# Patient Record
Sex: MALE | Race: WHITE | Employment: FULL TIME | URBAN - METROPOLITAN AREA
[De-identification: names, ages, dates, MRNs, and addresses within clinical notes are randomized per-mention and may not be internally consistent; named-entity substitution may affect disease eponyms.]

---

## 2021-10-22 ENCOUNTER — APPOINTMENT (OUTPATIENT)
Dept: CT IMAGING | Age: 56
End: 2021-10-22
Payer: MEDICAID

## 2021-10-22 ENCOUNTER — APPOINTMENT (OUTPATIENT)
Dept: GENERAL RADIOLOGY | Age: 56
End: 2021-10-22
Payer: MEDICAID

## 2021-10-22 ENCOUNTER — APPOINTMENT (OUTPATIENT)
Dept: MRI IMAGING | Age: 56
DRG: 045 | End: 2021-10-22
Attending: INTERNAL MEDICINE
Payer: MEDICAID

## 2021-10-22 ENCOUNTER — HOSPITAL ENCOUNTER (INPATIENT)
Age: 56
LOS: 2 days | Discharge: HOME | DRG: 045 | End: 2021-10-24
Attending: INTERNAL MEDICINE | Admitting: INTERNAL MEDICINE
Payer: MEDICAID

## 2021-10-22 ENCOUNTER — HOSPITAL ENCOUNTER (EMERGENCY)
Age: 56
Discharge: ANOTHER HOSPITAL | End: 2021-10-22
Attending: EMERGENCY MEDICINE
Payer: MEDICAID

## 2021-10-22 ENCOUNTER — APPOINTMENT (OUTPATIENT)
Dept: GENERAL RADIOLOGY | Age: 56
DRG: 045 | End: 2021-10-22
Attending: INTERNAL MEDICINE
Payer: MEDICAID

## 2021-10-22 VITALS
DIASTOLIC BLOOD PRESSURE: 94 MMHG | OXYGEN SATURATION: 95 % | HEIGHT: 72 IN | HEART RATE: 98 BPM | RESPIRATION RATE: 24 BRPM | SYSTOLIC BLOOD PRESSURE: 133 MMHG | WEIGHT: 280.43 LBS | TEMPERATURE: 98.4 F | BODY MASS INDEX: 37.98 KG/M2

## 2021-10-22 DIAGNOSIS — R20.2 ARM PARESTHESIA, RIGHT: ICD-10-CM

## 2021-10-22 DIAGNOSIS — I63.9 CEREBROVASCULAR ACCIDENT (CVA), UNSPECIFIED MECHANISM (HCC): Primary | ICD-10-CM

## 2021-10-22 DIAGNOSIS — R77.8 ELEVATED TROPONIN: ICD-10-CM

## 2021-10-22 DIAGNOSIS — I10 ACCELERATED HYPERTENSION: ICD-10-CM

## 2021-10-22 DIAGNOSIS — I63.9 ACUTE CVA (CEREBROVASCULAR ACCIDENT) (HCC): Primary | ICD-10-CM

## 2021-10-22 DIAGNOSIS — I50.9 ACUTE ON CHRONIC CONGESTIVE HEART FAILURE, UNSPECIFIED HEART FAILURE TYPE (HCC): ICD-10-CM

## 2021-10-22 DIAGNOSIS — E11.65 TYPE 2 DIABETES MELLITUS WITH HYPERGLYCEMIA, WITHOUT LONG-TERM CURRENT USE OF INSULIN (HCC): ICD-10-CM

## 2021-10-22 DIAGNOSIS — R53.1 ACUTE RIGHT-SIDED WEAKNESS: ICD-10-CM

## 2021-10-22 DIAGNOSIS — R09.02 HYPOXIA: ICD-10-CM

## 2021-10-22 PROBLEM — I63.512 CEREBROVASCULAR ACCIDENT (CVA) DUE TO OCCLUSION OF LEFT MIDDLE CEREBRAL ARTERY (HCC): Status: ACTIVE | Noted: 2021-10-22

## 2021-10-22 LAB
A/G RATIO: 1.2 (ref 1.1–2.2)
ALBUMIN SERPL-MCNC: 4.1 G/DL (ref 3.4–5)
ALP BLD-CCNC: 106 U/L (ref 40–129)
ALT SERPL-CCNC: 28 U/L (ref 10–40)
ANION GAP SERPL CALCULATED.3IONS-SCNC: 11 MMOL/L (ref 3–16)
ANION GAP SERPL CALCULATED.3IONS-SCNC: 12 MMOL/L (ref 3–16)
APTT: 28.4 SEC (ref 26.2–38.6)
AST SERPL-CCNC: 18 U/L (ref 15–37)
BASOPHILS ABSOLUTE: 0.1 K/UL (ref 0–0.2)
BASOPHILS ABSOLUTE: 0.2 K/UL (ref 0–0.2)
BASOPHILS RELATIVE PERCENT: 0.7 %
BASOPHILS RELATIVE PERCENT: 1.4 %
BILIRUB SERPL-MCNC: 2.2 MG/DL (ref 0–1)
BUN BLDV-MCNC: 16 MG/DL (ref 7–20)
BUN BLDV-MCNC: 18 MG/DL (ref 7–20)
CALCIUM SERPL-MCNC: 8.5 MG/DL (ref 8.3–10.6)
CALCIUM SERPL-MCNC: 9.3 MG/DL (ref 8.3–10.6)
CHLORIDE BLD-SCNC: 98 MMOL/L (ref 99–110)
CHLORIDE BLD-SCNC: 99 MMOL/L (ref 99–110)
CO2: 26 MMOL/L (ref 21–32)
CO2: 28 MMOL/L (ref 21–32)
CREAT SERPL-MCNC: 1 MG/DL (ref 0.9–1.3)
CREAT SERPL-MCNC: 1.1 MG/DL (ref 0.9–1.3)
EKG ATRIAL RATE: 85 BPM
EKG ATRIAL RATE: 99 BPM
EKG DIAGNOSIS: NORMAL
EKG DIAGNOSIS: NORMAL
EKG P AXIS: 57 DEGREES
EKG P AXIS: 65 DEGREES
EKG P-R INTERVAL: 164 MS
EKG P-R INTERVAL: 166 MS
EKG Q-T INTERVAL: 364 MS
EKG Q-T INTERVAL: 408 MS
EKG QRS DURATION: 100 MS
EKG QRS DURATION: 98 MS
EKG QTC CALCULATION (BAZETT): 467 MS
EKG QTC CALCULATION (BAZETT): 485 MS
EKG R AXIS: -20 DEGREES
EKG R AXIS: 12 DEGREES
EKG T AXIS: 71 DEGREES
EKG T AXIS: 78 DEGREES
EKG VENTRICULAR RATE: 85 BPM
EKG VENTRICULAR RATE: 99 BPM
EOSINOPHILS ABSOLUTE: 0.4 K/UL (ref 0–0.6)
EOSINOPHILS ABSOLUTE: 0.6 K/UL (ref 0–0.6)
EOSINOPHILS RELATIVE PERCENT: 3.4 %
EOSINOPHILS RELATIVE PERCENT: 4.8 %
GFR AFRICAN AMERICAN: >60
GFR AFRICAN AMERICAN: >60
GFR NON-AFRICAN AMERICAN: >60
GFR NON-AFRICAN AMERICAN: >60
GLOBULIN: 3.3 G/DL
GLUCOSE BLD-MCNC: 207 MG/DL (ref 70–99)
GLUCOSE BLD-MCNC: 214 MG/DL (ref 70–99)
GLUCOSE BLD-MCNC: 215 MG/DL (ref 70–99)
GLUCOSE BLD-MCNC: 218 MG/DL (ref 70–99)
GLUCOSE BLD-MCNC: 230 MG/DL (ref 70–99)
GLUCOSE BLD-MCNC: 264 MG/DL (ref 70–99)
GLUCOSE BLD-MCNC: 266 MG/DL (ref 70–99)
HCT VFR BLD CALC: 41.9 % (ref 40.5–52.5)
HCT VFR BLD CALC: 44.6 % (ref 40.5–52.5)
HEMOGLOBIN: 13.6 G/DL (ref 13.5–17.5)
HEMOGLOBIN: 14.7 G/DL (ref 13.5–17.5)
INR BLD: 1.37 (ref 0.88–1.12)
LV EF: 13 %
LVEF MODALITY: NORMAL
LYMPHOCYTES ABSOLUTE: 1.9 K/UL (ref 1–5.1)
LYMPHOCYTES ABSOLUTE: 2.8 K/UL (ref 1–5.1)
LYMPHOCYTES RELATIVE PERCENT: 17 %
LYMPHOCYTES RELATIVE PERCENT: 21.2 %
MAGNESIUM: 1.6 MG/DL (ref 1.8–2.4)
MCH RBC QN AUTO: 29.3 PG (ref 26–34)
MCH RBC QN AUTO: 29.4 PG (ref 26–34)
MCHC RBC AUTO-ENTMCNC: 32.6 G/DL (ref 31–36)
MCHC RBC AUTO-ENTMCNC: 33.1 G/DL (ref 31–36)
MCV RBC AUTO: 89.1 FL (ref 80–100)
MCV RBC AUTO: 89.9 FL (ref 80–100)
MONOCYTES ABSOLUTE: 0.6 K/UL (ref 0–1.3)
MONOCYTES ABSOLUTE: 0.7 K/UL (ref 0–1.3)
MONOCYTES RELATIVE PERCENT: 5.5 %
MONOCYTES RELATIVE PERCENT: 5.5 %
NEUTROPHILS ABSOLUTE: 8.2 K/UL (ref 1.7–7.7)
NEUTROPHILS ABSOLUTE: 8.9 K/UL (ref 1.7–7.7)
NEUTROPHILS RELATIVE PERCENT: 67.1 %
NEUTROPHILS RELATIVE PERCENT: 73.4 %
PDW BLD-RTO: 14.3 % (ref 12.4–15.4)
PDW BLD-RTO: 14.4 % (ref 12.4–15.4)
PERFORMED ON: ABNORMAL
PLATELET # BLD: 163 K/UL (ref 135–450)
PLATELET # BLD: 197 K/UL (ref 135–450)
PMV BLD AUTO: 10.5 FL (ref 5–10.5)
PMV BLD AUTO: 9.8 FL (ref 5–10.5)
POTASSIUM REFLEX MAGNESIUM: 3.6 MMOL/L (ref 3.5–5.1)
POTASSIUM SERPL-SCNC: 3.4 MMOL/L (ref 3.5–5.1)
PRO-BNP: 2399 PG/ML (ref 0–124)
PROTHROMBIN TIME: 15.7 SEC (ref 9.9–12.7)
RBC # BLD: 4.66 M/UL (ref 4.2–5.9)
RBC # BLD: 5 M/UL (ref 4.2–5.9)
SARS-COV-2, NAAT: NOT DETECTED
SODIUM BLD-SCNC: 136 MMOL/L (ref 136–145)
SODIUM BLD-SCNC: 138 MMOL/L (ref 136–145)
TOTAL PROTEIN: 7.4 G/DL (ref 6.4–8.2)
TROPONIN: 0.04 NG/ML
TROPONIN: 0.07 NG/ML
TROPONIN: 0.08 NG/ML
TROPONIN: <0.01 NG/ML
WBC # BLD: 11.1 K/UL (ref 4–11)
WBC # BLD: 13.2 K/UL (ref 4–11)

## 2021-10-22 PROCEDURE — 2000000000 HC ICU R&B

## 2021-10-22 PROCEDURE — 96365 THER/PROPH/DIAG IV INF INIT: CPT

## 2021-10-22 PROCEDURE — 6370000000 HC RX 637 (ALT 250 FOR IP): Performed by: STUDENT IN AN ORGANIZED HEALTH CARE EDUCATION/TRAINING PROGRAM

## 2021-10-22 PROCEDURE — 6360000004 HC RX CONTRAST MEDICATION: Performed by: STUDENT IN AN ORGANIZED HEALTH CARE EDUCATION/TRAINING PROGRAM

## 2021-10-22 PROCEDURE — 93005 ELECTROCARDIOGRAM TRACING: CPT | Performed by: EMERGENCY MEDICINE

## 2021-10-22 PROCEDURE — 4A03X5D MEASUREMENT OF ARTERIAL FLOW, INTRACRANIAL, EXTERNAL APPROACH: ICD-10-PCS | Performed by: INTERNAL MEDICINE

## 2021-10-22 PROCEDURE — 92523 SPEECH SOUND LANG COMPREHEN: CPT

## 2021-10-22 PROCEDURE — U0003 INFECTIOUS AGENT DETECTION BY NUCLEIC ACID (DNA OR RNA); SEVERE ACUTE RESPIRATORY SYNDROME CORONAVIRUS 2 (SARS-COV-2) (CORONAVIRUS DISEASE [COVID-19]), AMPLIFIED PROBE TECHNIQUE, MAKING USE OF HIGH THROUGHPUT TECHNOLOGIES AS DESCRIBED BY CMS-2020-01-R: HCPCS

## 2021-10-22 PROCEDURE — 70450 CT HEAD/BRAIN W/O DYE: CPT

## 2021-10-22 PROCEDURE — 96375 TX/PRO/DX INJ NEW DRUG ADDON: CPT

## 2021-10-22 PROCEDURE — 87635 SARS-COV-2 COVID-19 AMP PRB: CPT

## 2021-10-22 PROCEDURE — 36415 COLL VENOUS BLD VENIPUNCTURE: CPT

## 2021-10-22 PROCEDURE — 70496 CT ANGIOGRAPHY HEAD: CPT

## 2021-10-22 PROCEDURE — 6360000002 HC RX W HCPCS: Performed by: STUDENT IN AN ORGANIZED HEALTH CARE EDUCATION/TRAINING PROGRAM

## 2021-10-22 PROCEDURE — C8929 TTE W OR WO FOL WCON,DOPPLER: HCPCS

## 2021-10-22 PROCEDURE — 85730 THROMBOPLASTIN TIME PARTIAL: CPT

## 2021-10-22 PROCEDURE — 73630 X-RAY EXAM OF FOOT: CPT

## 2021-10-22 PROCEDURE — 85610 PROTHROMBIN TIME: CPT

## 2021-10-22 PROCEDURE — 85025 COMPLETE CBC W/AUTO DIFF WBC: CPT

## 2021-10-22 PROCEDURE — 99284 EMERGENCY DEPT VISIT MOD MDM: CPT

## 2021-10-22 PROCEDURE — 83735 ASSAY OF MAGNESIUM: CPT

## 2021-10-22 PROCEDURE — U0005 INFEC AGEN DETEC AMPLI PROBE: HCPCS

## 2021-10-22 PROCEDURE — 6360000002 HC RX W HCPCS: Performed by: EMERGENCY MEDICINE

## 2021-10-22 PROCEDURE — 99223 1ST HOSP IP/OBS HIGH 75: CPT | Performed by: PSYCHIATRY & NEUROLOGY

## 2021-10-22 PROCEDURE — 93005 ELECTROCARDIOGRAM TRACING: CPT | Performed by: STUDENT IN AN ORGANIZED HEALTH CARE EDUCATION/TRAINING PROGRAM

## 2021-10-22 PROCEDURE — 83880 ASSAY OF NATRIURETIC PEPTIDE: CPT

## 2021-10-22 PROCEDURE — 80048 BASIC METABOLIC PNL TOTAL CA: CPT

## 2021-10-22 PROCEDURE — 2500000003 HC RX 250 WO HCPCS: Performed by: STUDENT IN AN ORGANIZED HEALTH CARE EDUCATION/TRAINING PROGRAM

## 2021-10-22 PROCEDURE — 99223 1ST HOSP IP/OBS HIGH 75: CPT | Performed by: INTERNAL MEDICINE

## 2021-10-22 PROCEDURE — 2500000003 HC RX 250 WO HCPCS: Performed by: EMERGENCY MEDICINE

## 2021-10-22 PROCEDURE — 92610 EVALUATE SWALLOWING FUNCTION: CPT

## 2021-10-22 PROCEDURE — APPNB180 APP NON BILLABLE TIME > 60 MINS: Performed by: NURSE PRACTITIONER

## 2021-10-22 PROCEDURE — 84484 ASSAY OF TROPONIN QUANT: CPT

## 2021-10-22 PROCEDURE — 80053 COMPREHEN METABOLIC PANEL: CPT

## 2021-10-22 PROCEDURE — 94150 VITAL CAPACITY TEST: CPT

## 2021-10-22 PROCEDURE — 93010 ELECTROCARDIOGRAM REPORT: CPT | Performed by: INTERNAL MEDICINE

## 2021-10-22 PROCEDURE — 71045 X-RAY EXAM CHEST 1 VIEW: CPT

## 2021-10-22 PROCEDURE — 6360000004 HC RX CONTRAST MEDICATION: Performed by: EMERGENCY MEDICINE

## 2021-10-22 PROCEDURE — 2700000000 HC OXYGEN THERAPY PER DAY

## 2021-10-22 PROCEDURE — 94761 N-INVAS EAR/PLS OXIMETRY MLT: CPT

## 2021-10-22 PROCEDURE — 70551 MRI BRAIN STEM W/O DYE: CPT

## 2021-10-22 RX ORDER — INSULIN LISPRO 100 [IU]/ML
0-6 INJECTION, SOLUTION INTRAVENOUS; SUBCUTANEOUS
Status: DISCONTINUED | OUTPATIENT
Start: 2021-10-22 | End: 2021-10-23

## 2021-10-22 RX ORDER — DEXTROSE MONOHYDRATE 25 G/50ML
12.5 INJECTION, SOLUTION INTRAVENOUS PRN
Status: DISCONTINUED | OUTPATIENT
Start: 2021-10-22 | End: 2021-10-24 | Stop reason: HOSPADM

## 2021-10-22 RX ORDER — ASPIRIN 81 MG/1
81 TABLET ORAL DAILY
Status: DISCONTINUED | OUTPATIENT
Start: 2021-10-23 | End: 2021-10-24 | Stop reason: HOSPADM

## 2021-10-22 RX ORDER — ASPIRIN 300 MG/1
300 SUPPOSITORY RECTAL DAILY
Status: DISCONTINUED | OUTPATIENT
Start: 2021-10-23 | End: 2021-10-24 | Stop reason: HOSPADM

## 2021-10-22 RX ORDER — MAGNESIUM SULFATE IN WATER 40 MG/ML
2000 INJECTION, SOLUTION INTRAVENOUS ONCE
Status: COMPLETED | OUTPATIENT
Start: 2021-10-22 | End: 2021-10-22

## 2021-10-22 RX ORDER — POLYETHYLENE GLYCOL 3350 17 G/17G
17 POWDER, FOR SOLUTION ORAL DAILY PRN
Status: DISCONTINUED | OUTPATIENT
Start: 2021-10-22 | End: 2021-10-24 | Stop reason: HOSPADM

## 2021-10-22 RX ORDER — FUROSEMIDE 10 MG/ML
20 INJECTION INTRAMUSCULAR; INTRAVENOUS ONCE
Status: COMPLETED | OUTPATIENT
Start: 2021-10-22 | End: 2021-10-22

## 2021-10-22 RX ORDER — FUROSEMIDE 10 MG/ML
40 INJECTION INTRAMUSCULAR; INTRAVENOUS ONCE
Status: COMPLETED | OUTPATIENT
Start: 2021-10-22 | End: 2021-10-22

## 2021-10-22 RX ORDER — SODIUM CHLORIDE 0.9 % (FLUSH) 0.9 %
5-40 SYRINGE (ML) INJECTION EVERY 12 HOURS SCHEDULED
Status: DISCONTINUED | OUTPATIENT
Start: 2021-10-22 | End: 2021-10-22 | Stop reason: HOSPADM

## 2021-10-22 RX ORDER — INSULIN LISPRO 100 [IU]/ML
0-3 INJECTION, SOLUTION INTRAVENOUS; SUBCUTANEOUS NIGHTLY
Status: DISCONTINUED | OUTPATIENT
Start: 2021-10-22 | End: 2021-10-23

## 2021-10-22 RX ORDER — SODIUM CHLORIDE 0.9 % (FLUSH) 0.9 %
5-40 SYRINGE (ML) INJECTION PRN
Status: DISCONTINUED | OUTPATIENT
Start: 2021-10-22 | End: 2021-10-22 | Stop reason: HOSPADM

## 2021-10-22 RX ORDER — ONDANSETRON 2 MG/ML
4 INJECTION INTRAMUSCULAR; INTRAVENOUS ONCE
Status: COMPLETED | OUTPATIENT
Start: 2021-10-22 | End: 2021-10-22

## 2021-10-22 RX ORDER — ONDANSETRON 2 MG/ML
4 INJECTION INTRAMUSCULAR; INTRAVENOUS EVERY 6 HOURS PRN
Status: DISCONTINUED | OUTPATIENT
Start: 2021-10-22 | End: 2021-10-24 | Stop reason: HOSPADM

## 2021-10-22 RX ORDER — LABETALOL HYDROCHLORIDE 5 MG/ML
10 INJECTION, SOLUTION INTRAVENOUS EVERY 10 MIN PRN
Status: DISCONTINUED | OUTPATIENT
Start: 2021-10-22 | End: 2021-10-24 | Stop reason: HOSPADM

## 2021-10-22 RX ORDER — DEXTROSE MONOHYDRATE 50 MG/ML
100 INJECTION, SOLUTION INTRAVENOUS PRN
Status: DISCONTINUED | OUTPATIENT
Start: 2021-10-22 | End: 2021-10-24 | Stop reason: HOSPADM

## 2021-10-22 RX ORDER — LABETALOL HYDROCHLORIDE 5 MG/ML
20 INJECTION, SOLUTION INTRAVENOUS ONCE
Status: COMPLETED | OUTPATIENT
Start: 2021-10-22 | End: 2021-10-22

## 2021-10-22 RX ORDER — SODIUM CHLORIDE 9 MG/ML
25 INJECTION, SOLUTION INTRAVENOUS PRN
Status: DISCONTINUED | OUTPATIENT
Start: 2021-10-22 | End: 2021-10-22 | Stop reason: HOSPADM

## 2021-10-22 RX ORDER — ONDANSETRON 4 MG/1
4 TABLET, ORALLY DISINTEGRATING ORAL EVERY 8 HOURS PRN
Status: DISCONTINUED | OUTPATIENT
Start: 2021-10-22 | End: 2021-10-24 | Stop reason: HOSPADM

## 2021-10-22 RX ORDER — 0.9 % SODIUM CHLORIDE 0.9 %
50 INTRAVENOUS SOLUTION INTRAVENOUS ONCE
Status: DISCONTINUED | OUTPATIENT
Start: 2021-10-22 | End: 2021-10-22 | Stop reason: HOSPADM

## 2021-10-22 RX ORDER — NICOTINE POLACRILEX 4 MG
15 LOZENGE BUCCAL PRN
Status: DISCONTINUED | OUTPATIENT
Start: 2021-10-22 | End: 2021-10-24 | Stop reason: HOSPADM

## 2021-10-22 RX ORDER — DEXTROSE MONOHYDRATE 25 G/50ML
12.5 INJECTION, SOLUTION INTRAVENOUS
Status: DISCONTINUED | OUTPATIENT
Start: 2021-10-22 | End: 2021-10-22 | Stop reason: HOSPADM

## 2021-10-22 RX ORDER — POTASSIUM CHLORIDE 20 MEQ/1
40 TABLET, EXTENDED RELEASE ORAL ONCE
Status: COMPLETED | OUTPATIENT
Start: 2021-10-22 | End: 2021-10-22

## 2021-10-22 RX ORDER — PROCHLORPERAZINE EDISYLATE 5 MG/ML
5 INJECTION INTRAMUSCULAR; INTRAVENOUS EVERY 6 HOURS PRN
Status: DISCONTINUED | OUTPATIENT
Start: 2021-10-22 | End: 2021-10-24 | Stop reason: HOSPADM

## 2021-10-22 RX ORDER — ATORVASTATIN CALCIUM 80 MG/1
80 TABLET, FILM COATED ORAL NIGHTLY
Status: DISCONTINUED | OUTPATIENT
Start: 2021-10-22 | End: 2021-10-24 | Stop reason: HOSPADM

## 2021-10-22 RX ORDER — SCOLOPAMINE TRANSDERMAL SYSTEM 1 MG/1
1 PATCH, EXTENDED RELEASE TRANSDERMAL
Status: DISCONTINUED | OUTPATIENT
Start: 2021-10-22 | End: 2021-10-24 | Stop reason: HOSPADM

## 2021-10-22 RX ADMIN — MAGNESIUM SULFATE HEPTAHYDRATE 2000 MG: 2 INJECTION, SOLUTION INTRAVENOUS at 09:56

## 2021-10-22 RX ADMIN — PROCHLORPERAZINE EDISYLATE 5 MG: 5 INJECTION INTRAMUSCULAR; INTRAVENOUS at 15:37

## 2021-10-22 RX ADMIN — INSULIN LISPRO 3 UNITS: 100 INJECTION, SOLUTION INTRAVENOUS; SUBCUTANEOUS at 13:20

## 2021-10-22 RX ADMIN — ONDANSETRON 4 MG: 2 INJECTION INTRAMUSCULAR; INTRAVENOUS at 01:23

## 2021-10-22 RX ADMIN — FUROSEMIDE 40 MG: 10 INJECTION, SOLUTION INTRAMUSCULAR; INTRAVENOUS at 13:14

## 2021-10-22 RX ADMIN — FUROSEMIDE 20 MG: 10 INJECTION, SOLUTION INTRAMUSCULAR; INTRAVENOUS at 05:28

## 2021-10-22 RX ADMIN — INSULIN LISPRO 1 UNITS: 100 INJECTION, SOLUTION INTRAVENOUS; SUBCUTANEOUS at 20:45

## 2021-10-22 RX ADMIN — LABETALOL HYDROCHLORIDE 10 MG: 5 INJECTION, SOLUTION INTRAVENOUS at 08:40

## 2021-10-22 RX ADMIN — IOPAMIDOL 75 ML: 755 INJECTION, SOLUTION INTRAVENOUS at 01:09

## 2021-10-22 RX ADMIN — PERFLUTREN 1.65 MG: 6.52 INJECTION, SUSPENSION INTRAVENOUS at 11:46

## 2021-10-22 RX ADMIN — ALTEPLASE 9 MG: KIT at 02:10

## 2021-10-22 RX ADMIN — INSULIN LISPRO 3 UNITS: 100 INJECTION, SOLUTION INTRAVENOUS; SUBCUTANEOUS at 17:45

## 2021-10-22 RX ADMIN — LABETALOL HYDROCHLORIDE 10 MG: 5 INJECTION, SOLUTION INTRAVENOUS at 15:22

## 2021-10-22 RX ADMIN — ONDANSETRON 4 MG: 2 INJECTION INTRAMUSCULAR; INTRAVENOUS at 09:46

## 2021-10-22 RX ADMIN — ALTEPLASE 81 MG: KIT at 02:19

## 2021-10-22 RX ADMIN — LABETALOL HYDROCHLORIDE 20 MG: 5 INJECTION, SOLUTION INTRAVENOUS at 01:48

## 2021-10-22 RX ADMIN — ATORVASTATIN CALCIUM 80 MG: 80 TABLET, FILM COATED ORAL at 20:40

## 2021-10-22 RX ADMIN — POTASSIUM CHLORIDE 40 MEQ: 1500 TABLET, EXTENDED RELEASE ORAL at 05:28

## 2021-10-22 ASSESSMENT — PAIN SCALES - GENERAL
PAINLEVEL_OUTOF10: 0

## 2021-10-22 ASSESSMENT — ENCOUNTER SYMPTOMS
ABDOMINAL DISTENTION: 0
SHORTNESS OF BREATH: 0
ABDOMINAL PAIN: 0
CHEST TIGHTNESS: 0
FACIAL SWELLING: 0
CHOKING: 0
PHOTOPHOBIA: 0
ROS SKIN COMMENTS: LEFT FOOT
APNEA: 0
COUGH: 0
WHEEZING: 0

## 2021-10-22 NOTE — CONSULTS
ICU Consult Note    Admit Date: 10/22/2021  Day: 1  Vent Day: None  IV Access:Peripheral  IV Fluids:None  Vasopressors:None                Antibiotics: None  Diet: No diet orders on file    CC: Right upper extremity weakness    Interval history: No acute overnight events. HPI:  60year old male patient with PMH CHF, DM2 presenting with RUE weakness. He was driving home from work after stopping at [employer], and upon stepping out of his car noticed that he couldn't raise his right arm and also felt numbness/tingling. He had no other neurological deficits at that time. This was around 10:40 p.m. He called his sister who was a physician who advised him to go to the hospital. He drove himself using his left hand. His initial NIHSS 2-3. After CT, patient was unable to transfer himself to [facility] because his right leg became weak as well. Patient was within TPA window and with discussion patient was agreeable to TPA administered at 2:10 a.m. He was subsequently transferred to ICU at [facility]. Endorses nausea, denies fever, chills, lightheadedness, chest pain, shortness of breath, abdominal pain, constipation, diarrhea. Denies smoking, drinking, drug use. Works as a  and lives alone. Father recently passed. Patient has a wound on sole of left foot from blister that ruptured after walking on hot pavement. Patient is vaccinated with Silvino Melendez x2. He states that he takes furosemide, lisinopril, and metoprolol.     In ICU, patient was hypertensive to 156/126. He had troponin elevation of 0.08 and BNP 2399. Glu 207. Electrolytes wnl. Leukocytosis of 13.2. INR 1.37. CXR significant for multifocal patchy opacities; pulmonary edema versus multifocal pneumonia.     Medications:     Scheduled Meds:   [START ON 10/23/2021] aspirin  81 mg Oral Daily    Or    [START ON 10/23/2021] aspirin  300 mg Rectal Daily    atorvastatin  80 mg Oral Nightly    insulin lispro  0-6 Units SubCUTAneous TID     insulin lispro  0-3 Units SubCUTAneous Nightly    influenza virus vaccine  0.5 mL IntraMUSCular Prior to discharge    magnesium sulfate  2,000 mg IntraVENous Once     Continuous Infusions:   dextrose       PRN Meds:ondansetron **OR** ondansetron, polyethylene glycol, perflutren lipid microspheres, labetalol, glucose, dextrose, glucagon (rDNA), dextrose    Objective:   Vitals:   T-max:  Patient Vitals for the past 8 hrs:   BP Temp Temp src Pulse Resp SpO2 Height Weight   10/22/21 1015 (!) 141/100   90 22 93 %     10/22/21 1000 (!) 135/100   90 (!) 31 91 %     10/22/21 0945 (!) 146/102   89 30 95 %     10/22/21 0930 (!) 169/107   97 20 90 %     10/22/21 0915 (!) 155/109   92 (!) 35 91 %     10/22/21 0908     24 95 %     10/22/21 0900 (!) 140/87   94 20 92 %     10/22/21 0845 (!) 175/119   100 (!) 31 92 %     10/22/21 0830 (!) 145/105   90 19 94 %     10/22/21 0815 (!) 148/106   90 28 94 %     10/22/21 0800 (!) 141/101 98.1 °F (36.7 °C) Oral 91 25 93 %     10/22/21 0745 (!) 138/106   88 17 96 %     10/22/21 0730 (!) 136/101   89 24 96 %     10/22/21 0715 139/88   85 24 96 %     10/22/21 0700 (!) 129/100   85 21 94 %     10/22/21 0645 (!) 152/112   92 16 96 %     10/22/21 0630 (!) 129/97   83 22 95 % 6' (1.829 m) 280 lb 6.8 oz (127.2 kg)   10/22/21 0615 (!) 131/102   82 23 96 %     10/22/21 0545 (!) 140/104   86 28 97 %     10/22/21 0515 (!) 125/92   84 25 95 %     10/22/21 0500    83       10/22/21 0445 (!) 131/95   85 24      10/22/21 0430 (!) 154/98   88 (!) 31 97 %     10/22/21 0415 (!) 145/98   83 29 95 %     10/22/21 0400 (!) 156/126 98 °F (36.7 °C) Oral 90 22 97 %     10/22/21 0345 (!) 142/96   88 21 95 %     10/22/21 0330 (!) 121/99 97.5 °F (36.4 °C) Axillary 88 24 95 %         Intake/Output Summary (Last 24 hours) at 10/22/2021 1056  Last data filed at 10/22/2021 0805  Gross per 24 hour   Intake 150 ml   Output 850 ml   Net -700 ml       Review of Systems   Constitutional: Positive for activity change. Negative for appetite change, fatigue and fever. HENT: Negative for facial swelling and hearing loss. Eyes: Negative for photophobia and visual disturbance. Respiratory: Negative for apnea, cough, choking, chest tightness and shortness of breath. Cardiovascular: Positive for leg swelling. Negative for chest pain. Musculoskeletal: Negative for neck pain and neck stiffness. Skin: Positive for wound. Negative for pallor. Left foot   Neurological: Positive for weakness and numbness. Negative for tremors, facial asymmetry, speech difficulty, light-headedness and headaches. Tingling sensation in the right arm, though sensations on the rt arm are intact. Able to keep right arm against the gravity but profound weakness of the right hand. Psychiatric/Behavioral: Negative for behavioral problems and confusion. Physical Exam  Constitutional:       Appearance: Normal appearance. He is not ill-appearing. HENT:      Nose: Nose normal. No congestion or rhinorrhea. Mouth/Throat:      Pharynx: No oropharyngeal exudate or posterior oropharyngeal erythema. Eyes:      Extraocular Movements: Extraocular movements intact. Conjunctiva/sclera: Conjunctivae normal.      Pupils: Pupils are equal, round, and reactive to light. Cardiovascular:      Rate and Rhythm: Normal rate and regular rhythm. Pulses: Normal pulses. Heart sounds: Normal heart sounds. Pulmonary:      Effort: Pulmonary effort is normal.   Musculoskeletal:         General: Signs of injury present. Right lower leg: Edema present. Left lower leg: Edema present. Comments: At the base of the foot, on the head of the left metatarsal.   Skin:     General: Skin is warm. Neurological:      Mental Status: He is alert and oriented to person, place, and time.    Psychiatric:         Mood and Affect: Mood normal.         Behavior: Behavior normal.         Thought Content: Thought content normal.         Judgment: Judgment normal.                   LABS:    CBC:   Recent Labs     10/22/21  0100 10/22/21  0535   WBC 13.2* 11.1*   HGB 14.7 13.6   HCT 44.6 41.9    163   MCV 89.1 89.9     Renal:    Recent Labs     10/22/21  0100 10/22/21  0535    136   K 3.6 3.4*   CL 99 98*   CO2 28 26   BUN 18 16   CREATININE 1.1 1.0   GLUCOSE 218* 215*   CALCIUM 9.3 8.5   MG  --  1.60*   ANIONGAP 11 12     Hepatic:   Recent Labs     10/22/21  0100   AST 18   ALT 28   BILITOT 2.2*   PROT 7.4   LABALBU 4.1   ALKPHOS 106     Troponin:   Recent Labs     10/22/21  0100 10/22/21  0535   TROPONINI 0.08* 0.07*     BNP: No results for input(s): BNP in the last 72 hours. Lipids: No results for input(s): CHOL, HDL in the last 72 hours. Invalid input(s): LDLCALCU, TRIGLYCERIDE  ABGs:  No results for input(s): PHART, HXQ9RCS, PO2ART, ONZ8QQI, BEART, THGBART, J2MRRLBU, CEE9KMT in the last 72 hours. INR:   Recent Labs     10/22/21  0100   INR 1.37*     Lactate: No results for input(s): LACTATE in the last 72 hours. Cultures:  -----------------------------------------------------------------  RAD:   XR FOOT LEFT (MIN 3 VIEWS)   Final Result   1. Soft tissue defect in the plantar forefoot. 2.  No radiographic evidence of osteomyelitis. CT head without contrast    (Results Pending)   MRI BRAIN WO CONTRAST    (Results Pending)         Assessment/Plan:   The patient  is a 60years old male who presented with right upper extremity weakness that progressed to right lower extremity weakness he was transferred to the ICU following administration of TPA around 2:10 AM    TIA versus CVA  -Right upper extremity weakness, that progressed to rt lower extremity weakness. -CT scanning of the head showed no acute intracranial hemorrhage, mass-effect, midline shift or hydrocephalus with no loss of gray-white junction to indicate site of territorial infarct.   -CT of the head and neck showed no definite large vessel occlusion. Lungs demonstrated groundglass opacities with smooth interlobular septal thickening which may relate to pulmonary edema.  -Last known normal was around 10:40 PM, patient received TPA at 2:10 AM  -Plan to keep blood pressure under 185/105  -Patient n.p.o., ordered SLP/PT/OT  -Aspirin following repeat CT scan 24 hours after administration of TPA  -Follow-up with lipid panel, follow-up with HbA1c  -Inpatient consult to neurology  -Atorvastatin 80 mg  - F/U brain MRI        Mild CHF exacerbation  -Patient with bilateral pitting edema on exam  -Troponin 0 0.08  -proBNP 2399  -Evidence of pulmonary edema on chest x-ray  -However patient denies any chest pain or shortness of breath  -Plan  -The patient received 1 dose of 20 mg of Lasix  - Will avoid strict lowering of the blood pressure.   -We will be following up on EKG and trending troponins, BMP  - Will F/U echo and give patient lasix. Type 2 diabetes mellitus  -Patient takes Metformin at home  -POCT q4h  with low-dose sliding scale insulin  - Hypoglycemia protocol. Full thickness ulceration of the left foot:  - patient has hx of T2DM. - Podiatry consulted. - F/U XR of left foot shows soft tissue defect.   - No wound culture obtained because of no sign of acute infection, leukocytosis seems to be trending down        Will follow up with podiatry's recs. - Patient on COVID-19 precautions till he is ruled out. Code Status: Full  FEN: NPO  PPX: None  DISPO: ICU    Sandy Pastor MD, PGY-1  10/22/21  10:56 AM    This patient has been staffed and discussed with Dr. Johan Perez MD.    [facility]    Patient seen and examined. I agree with Dr. Sara Smith history, physical, lab findings, assessment and plan. Patient presented to [address] [facility] valuation of right arm weakness with last known well at 10:40 PM.  While obtaining CT head there he developed worsening right leg and arm weakness.

## 2021-10-22 NOTE — H&P
ICU HISTORY AND PHYSICAL       Hospital Day: 0  ICU Day: 0                                                         Code:Full Code  Admit Date: 10/22/2021  PCP: No primary care provider on file. CC: RUE weakness    HISTORY OF PRESENT ILLNESS:     60year old male patient with PMH CHF, DM2 presenting with RUE weakness. He was driving home from work after stopping at [employer], and upon stepping out of his car noticed that he couldn't raise his right arm and also felt numbness/tingling. He had no other neurological deficits at that time. This was around 10:40 p.m. He called his sister who was a physician who advised him to go to the hospital. He drove himself using his left hand. His initial NIHSS 2-3. After CT, patient was unable to transfer himself to [facility] because his right leg became weak as well. Patient was within TPA window and with discussion patient was agreeable to TPA administered at 2:10 a.m. He was subsequently transferred to ICU at [facility]. Endorses nausea, denies fever, chills, lightheadedness, chest pain, shortness of breath, abdominal pain, constipation, diarrhea. Denies smoking, drinking, drug use. Works as a  and lives alone. Father recently passed. Patient has a wound on sole of left foot from blister that ruptured after walking on hot pavement. Patient is vaccinated with Tiara Rudd x2. He states that he takes furosemide, lisinopril, and metoprolol. In ICU, patient was hypertensive to 156/126. He had troponin elevation of 0.08 and BNP 2399. Glu 207. Electrolytes wnl. Leukocytosis of 13.2. INR 1.37. CXR significant for multifocal patchy opacities; pulmonary edema versus multifocal pneumonia. PAST HISTORY:     Past Medical History:   Diagnosis Date    [facility]congestive [facility] failure) ([facility] [address]     Diabetes mellitus ([facility] [address]        No past surgical history on file. Surgical history reviewed and noncontributory.     SocialHistory:   The patient lives at home by himself    Alcohol: none  Illicit drugs: no use  Tobacco: none    Family History:  Reviewed and noncontributory    MEDICATIONS:     No current facility-administered medications on file prior to encounter. No current outpatient medications on file prior to encounter. Patient is on a diuretic, unable to verify doses      Scheduled Meds:   [START ON 10/23/2021] aspirin  81 mg Oral Daily    Or    [START ON 10/23/2021] aspirin  300 mg Rectal Daily    atorvastatin  80 mg Oral Nightly    insulin lispro  0-6 Units SubCUTAneous TID WC    insulin lispro  0-3 Units SubCUTAneous Nightly    potassium chloride  40 mEq Oral Once    furosemide  20 mg IntraVENous Once      Continuous Infusions:   dextrose       PRN Meds:ondansetron **OR** ondansetron, polyethylene glycol, perflutren lipid microspheres, labetalol, glucose, dextrose, glucagon (rDNA), dextrose    Allergies: No Known Allergies    REVIEW OF SYSTEMS:       History obtained from the patient    Review of Systems   Constitutional: Negative for chills and fever. Respiratory: Negative for cough, shortness of breath and wheezing. Cardiovascular: Negative for chest pain, palpitations and leg swelling. Gastrointestinal: Negative for abdominal distention and abdominal pain. Neurological: Positive for weakness and numbness. Negative for dizziness, facial asymmetry, light-headedness and headaches. All other systems were reviewed and negative    PHYSICAL EXAM:       Vitals: BP (!) 145/98   Pulse 83   Temp 98 °F (36.7 °C) (Oral)   Resp 29   SpO2 95%     I/O:  No intake or output data in the 24 hours ending 10/22/21 0430  No intake/output data recorded. No intake/output data recorded. Physical Examination:     Physical Exam  Constitutional:       General: He is not in acute distress. Appearance: Normal appearance. He is obese. HENT:      Head: Normocephalic and atraumatic. Cardiovascular:      Rate and Rhythm: Normal rate and regular rhythm. Pulses: Normal pulses. Heart sounds: Normal heart sounds. No murmur heard. Pulmonary:      Effort: Pulmonary effort is normal. No respiratory distress. Breath sounds: Normal breath sounds. No wheezing, rhonchi or rales. Abdominal:      General: Bowel sounds are normal. There is no distension. Tenderness: There is no abdominal tenderness. Musculoskeletal:         General: Normal range of motion. Right lower leg: Edema present. Left lower leg: Edema present. Comments: +2 pitting b/l   Skin:     Comments: Wound on sole of left foot without erythema, purulence, swelling   Neurological:      Mental Status: He is alert. Comments: CN II-XII intact; RUE 4/5 strength, diminished sensation   Psychiatric:         Mood and Affect: Mood normal.         Behavior: Behavior normal.            Access:   -Central Access Day #: 0                                  -Peripheral Access Day#:1  -Arterial line Day#:0                                  Mims Day#:0  NGT Day#: 0                                            ETT Day#:0  Vent Settings:    / / /     No results for input(s): PHART, PUV5DZY, PO2ART in the last 72 hours. DATA:       Labs:  CBC:   Recent Labs     10/22/21  0100   WBC 13.2*   HGB 14.7   HCT 44.6          BMP:   Recent Labs     10/22/21  0100      K 3.6   CL 99   CO2 28   BUN 18   CREATININE 1.1   GLUCOSE 218*     LFT's:   Recent Labs     10/22/21  0100   AST 18   ALT 28   BILITOT 2.2*   ALKPHOS 106     Troponin:   Recent Labs     10/22/21  0100   TROPONINI 0.08*     BNP:No results for input(s): BNP in the last 72 hours. ABGs: No results for input(s): PHART, TCB8JYA, PO2ART in the last 72 hours.   INR:   Recent Labs     10/22/21  0100   INR 1.37*       U/A:No results for input(s): NITRITE, COLORU, PHUR, LABCAST, WBCUA, RBCUA, MUCUS, TRICHOMONAS, YEAST, BACTERIA, CLARITYU, SPECGRAV, LEUKOCYTESUR, UROBILINOGEN, BILIRUBINUR, BLOODU, GLUCOSEU, AMORPHOUS in the last 72 hours. Invalid input(s): [facility]    CT head without contrast    (Results Pending)       EKG: pending  Echo: pending  Micro: n/a    ASSESSMENT AND PLAN:   Percy Mac is a 60 y.o. male, who presented with right upper extremity weakness and was transferred to [facility] ICU following administration of tpa. Symptoms began 10:40 pm and tPA administered 2:10 am.     TIA vs CVA  -CT - no acute intracranial hemorrhage, mass ffect, midline shift or hydrocephalus; no loss of gray-white junction to indicate site of territorial infarct. -CTA - no definite large vessel occlusion given suboptimal contrast opacification of the large arteries in the head/neck. Visualized lungs demonstrate ground-glass opacities with smooth interlobular septal thickening which may relate to pulmonary edema. Plan:  -BP goal < 185/105  -F/u CT 24 hours after tPA  -ASA following repeat CT  -echo with bubble  -Lipid panel  -HbA1C  -SLP/PT/OT  -atorvastatin 80 mg   -neurology    Mild CHF exacerbation  -Patient with bilateral pitting edema on exam with elevated proBNP  2399 and troponin 0.08 with evidence of pulmonary edema on CXR. Patient denies chest pain, shortness of breath. Plan:  -Given one dose lasix 20 mg  -follow up EKG  -trend troponin    DM2  -Patient takes metformin at home  Plan:  -LDSSI  -POCTq4  -hypoglycemia protocol        Code Status:Full Code  FEN: NPO  PPX:  SCD  DISPO: ICU    This patient has been staffed and discussed with Maryjane Rashid MD.   -----------------------------  Ej Iraheta MD, PGY-1  59/57/1155  2:14 AM    I certify that Percy Mac is expected to be hospitalized for 2 midnights based on the following assessment and plan:      Patient seen and examined, plan of care discussed with residents. Agree with their assessment and plan with following addendum:    Briefly this is a 47-year-old male with medical history of CHF and diabetes who presents after TPA administration for acute CVA.   Per ER report his presenting NIH stroke scale was 4, which progressed to 8 right prior to TPA administration. His strength is improving currently. We will proceed with stroke work-up and post TPA monitoring in the ICU. Patient also appears to be in mild CHF, will give dose of IV Lasix and monitor BP response, avoid drastic lowering of his blood pressure given acute CVA.      Candice Holloway MD

## 2021-10-22 NOTE — CONSULTS
Department of Podiatry Consult Note  Resident       Reason for Consult: Left foot wound, history of DM  Requesting Physician: Harsha Mon MD    CHIEF COMPLAINT: Left foot wound    HISTORY OF PRESENT ILLNESS:                The patient is a 60 y.o. male with significant past medical history as listed below. Podiatry was consulted for left foot wound. Patient reports approximately was in [place] walk around barefoot approximately 4 weeks ago when he developed a blister to his left foot. The blister then was drained on its own after walking on it with underlying wound noted. Patient has been cleaning and monitoring his wound daily. He denies any purulent drainage, malodor, increased tenderness to his left foot. Patient admits to being neuropathic due to his history of diabetes. Patient states he has never seen podiatrist before. Patient denies any other pedal complaints at this time. Patient was admitted for acute cerebrovascular accident. He reports tingling, decreased function to his right lower extremity at time of injury, but reports he has regained function at this time. Patient denies nausea, vomiting, fever, chills, shortness of breath, chest pain. Past Medical History:        Diagnosis Date    CHF (congestive heart failure) ([facility] [address]     Diabetes mellitus ([facility] [address]      Past Surgical History:    No past surgical history on file.   Current Medications:    Current Facility-Administered Medications: ondansetron (ZOFRAN-ODT) disintegrating tablet 4 mg, 4 mg, Oral, Q8H PRN **OR** ondansetron (ZOFRAN) injection 4 mg, 4 mg, IntraVENous, Q6H PRN  polyethylene glycol (GLYCOLAX) packet 17 g, 17 g, Oral, Daily PRN  [START ON 10/23/2021] aspirin EC tablet 81 mg, 81 mg, Oral, Daily **OR** [START ON 10/23/2021] aspirin suppository 300 mg, 300 mg, Rectal, Daily  perflutren lipid microspheres (DEFINITY) injection 1.65 mg, 1.5 mL, IntraVENous, ONCE PRN  labetalol (NORMODYNE;TRANDATE) injection 10 mg, 10 mg,

## 2021-10-22 NOTE — PLAN OF CARE
Problem: Skin Integrity:  Goal: Absence of new skin breakdown  Description: Absence of new skin breakdown  10/22/2021 1042 by Emma Alexis RN  Outcome: Ongoing  Note: Pt at risk for skin breakdown. See Dustin score. Pt remains on bedrest. Unable to reposition self in bed. Heels elevated off bed. Sacral heart mepilex intact to protect, site inspected and intact underneath. Will continue to turn and reposition patient every two hours and as needed. Will continue to keep patient clean and dry, applying skin care cream as needed. Pillows used for repositioning q2hs. Will continue to monitor and assess for skin breakdown. Problem: Falls - Risk of:  Goal: Will remain free from falls  Description: Will remain free from falls  10/22/2021 1042 by Emma Alexis RN  Outcome: Ongoing  Note: Pt is a fall risk. Fall risk protocol in place. See Patrizia Halo Fall Score. Pt bed is in low position, bed alarm is on, side rails up, fall risk bracelet applied. , non-skid footwear in use. Patient/family educated on fall risk protocol, instructed to call for assistance when needed and belongings are in reach. assistance. Will continue with hourly rounds for po intake, pain needs, toileting and repositioning as needed. Will continue to monitor for needs. Problem: Injury - Risk of, Healthcare-Acquired Condition:  Goal: Will remain free from falls  Description: Will remain free from falls  10/22/2021 1042 by Emma Alexis RN  Outcome: Ongoing  Note: Pt is a fall risk. Fall risk protocol in place. See Patrizia Halo Fall Score. Pt bed is in low position, bed alarm is on, side rails up, fall risk bracelet applied. , non-skid footwear in use. Patient/family educated on fall risk protocol, instructed to call for assistance when needed and belongings are in reach. assistance. Will continue with hourly rounds for po intake, pain needs, toileting and repositioning as needed. Will continue to monitor for needs.      Problem: Verbal Communication - Impaired:  Goal: Functional communication will improve  Description: Functional communication will improve  Outcome: Ongoing  Note: Pt able to communicate needs without issue.

## 2021-10-22 NOTE — PROGRESS NOTES
Stroke Team Phone Note    Patient is a 61 yo M with h/o HTN, DM2 with presents with acute onset RUE weakness and paresthesias ~12:30 AM.  Patient was driving his car when he noted the symptoms, and drove himself to the ED. Initially, NIHSS 2-3 with RUE weakness/clumsiness and sensory changes. After CT scan, patient was unable to transfer himself to the [facility] because R leg and arm became weak. CT/CTA without hemorrhage or acute ischemic change. CTA without LVO. /114  Gluc 207  Plt 197    Discussed with ED provider - NIHSS currently ~6.  ED provider agreed with assessment to give tPA. Dr. Anitha Sanderson states that she is comfortable consenting for/administering tPA. Assessment: 61 yo M with HTN, DM2 who p/w AIS, likely athreosclerotic vs thromboembolic. He is well within the window, and per chart review and ED provider hx has no apparent contraindications to tPA.     - SBP <185/105 prior to administration of tPA  - would give tPA 0.9 mg/kg (max 90 mg); 10% over 1 min and the remainder over 60 min  - Admit to ICU for neurochecks per post-tPA protocol  - CT or MRI safety scan 24 hours after administration of tPA  - hold ASA and DVT chemoprophylaxis for 24 hours post-tPA (until safety scan acquired)  - NPO until swallow eval  - PT/OT/SLP  - Lipid/A1c  - TTE with bubble  - Local Neurology C/S    Jerardo Basilio MD   Stroke Team

## 2021-10-22 NOTE — CARE COORDINATION
Case Management Assessment           Initial Evaluation                Date / Time of Evaluation: 10/22/2021 5:09 PM                 Assessment Completed by: Cookie Palm RN     Patient is from home alone and is very independent. He works, drives and is independent with ADL's. He had no services or DME needs prior. He lives in a two story home and will have transport by family to home at d/c. Patient Name: Ivone Bedoya     YOB: 1965  Diagnosis: Acute CVA (cerebrovascular accident) [facility]) [I63.9]     Date / Time: 10/22/2021  3:36 AM    Patient Admission Status: Inpatient    If patient is discharged prior to next notation, then this note serves as note for discharge by case management. Current PCP: No primary care provider on file. Clinic Patient: No    Chart Reviewed: Yes  Patient/ Family Interviewed: Yes    Initial assessment completed at bedside with: spoke with mother by phone-pt would not answer his phone    Hospitalization in the last 30 days: No    Emergency Contacts:  Extended Emergency Contact Information  Primary Emergency Contact: [address] [facility] Phone: [phone]  Mobile Phone: [phone]  Relation: Parent   needed? No  Secondary Emergency Contact: [address] Phone: [phone]  Mobile Phone: [phone]  Relation: Brother/Sister   needed? No    Advance Directives:   Code Status: Full Code    Healthcare Power of : No  Agent: NA  Contact Number: NA      Financial  Payor: /     Pre-cert required for SNF: No    Pharmacy  No Pharmacies Listed    Potential assistance Purchasing Medications:    Does Patient want to participate in local refill/ meds to beds program?:      Meds To Beds General Rules:  1. Can ONLY be done Monday- Friday between 8:30am-5pm  2. Prescription(s) must be in pharmacy by 3pm to be filled same day  3. Copy of patient's insurance/ prescription drug card and patient face sheet must be sent along with the prescription(s)  4. Cost of Rx cannot be added to hospital bill. If financial assistance is needed, please contact unit  or ;  or  CANNOT provide pharmacy voucher for patients co-pays  5. Patients can then  the prescription on their way out of the hospital at discharge, or pharmacy can deliver to the bedside if staff is available. (payment due at time of pick-up or delivery - cash, check, or card accepted)     Able to afford home medications/ co-pay costs: No    ADLS  Support Systems:      PT AM-PAC:   /24  OT AM-PAC:   /24    [place]: two story home  Steps: unsure    Plans to RETURN to current housing: Yes  Barriers to RETURNING to current housing: none noted    DISCHARGE PLAN:  Disposition: TBD    Transportation PLAN for discharge: family     Factors facilitating achievement of predicted outcomes: Family support    Barriers to discharge: covid r/o, MRI, therapy evals    Additional Case Management Notes: NA    The Plan for Transition of Care is related to the following treatment goals of Acute CVA (cerebrovascular accident) ([facility] [address] [I63.9]    The Patient and/or patient representative Catalina Downs and his family were provided with a choice of provider and agrees with the discharge plan Not Indicated    Freedom of choice list was provided with basic dialogue that supports the patient's individualized plan of care/goals and shares the quality data associated with the providers.  Not Indicated    Care Transition patient: No    Sal Ennis RN  [facility]  Case Management Department  Ph: [phone]   Fax: [phone]

## 2021-10-22 NOTE — CONSULTS
Neurology / Mariaelena Oro Consult Note    Smiley Murray MD is requesting this consult. Reason for Consult: Stroke  Admission Chief Complaint: Right Hand Weakness     HPI:   Isaura Siddiqui is a 60 y.o. y/o male with PMH significant for CHF, DM who presented with right hand weakness. Per my interview with the patient he was in his normal state of health and driving home from work in [place]. He stopped at [facility] on the way home and when he went to get out of the car, he noticed his right arm wasn't working. He called his sister who is a physician and she instructed him to go immediately to the ED. He was evaluated by Dr. Benny Vick with the stroke team and tPA was recommended. Bolus of tPA at 0210 and drip started at 0219. Aircared to [facility]. PAST MEDICAL HISTORY:  Past Medical History:   Diagnosis Date    CHF (congestive heart failure) ([facility] [address]     Diabetes mellitus ([facility] [address]        ALLERGIES:  No Known Allergies    SURGICAL HISTORY:  No past surgical history on file. FAMILY HISTORY:  Family history non-contributory  No family history on file. SOCIAL HISTORY:  Social History     Tobacco History     Smoking Status  Unknown If Ever Smoked    Smokeless Tobacco Use  Unknown          Alcohol History     Alcohol Use Status  Not Currently          Drug Use     Drug Use Status  Never          Sexual Activity     Sexually Active  Not Asked                HOME MEDICATIONS:  No medications prior to admission. ROS:   Constitutional- No weight loss or fevers   Eyes- No diplopia. No photophobia. Ears/nose/throat- No dysphagia. No Dysarthria   Cardiovascular- No palpitations. No chest pain   Respiratory- No dyspnea. No Cough   Gastrointestinal- No Abdominal pain. No Vomiting. Genitourinary- No incontinence. No urinary retention   Musculoskeletal- No myalgia. No arthralgia   Skin- No rash. No easy bruising. Psychiatric- No depression. No anxiety   Endocrine- No diabetes.  No thyroid issues. Hematologic- No bleeding difficulty. No fatigue   Neurologic- + weakness. No Headache. PHYSICAL EXAM:  BP (!) 129/97   Pulse 83   Temp 98 °F (36.7 °C) (Oral)   Resp 22   Ht 6' (1.829 m)   Wt 280 lb 6.8 oz (127.2 kg)   SpO2 95%   BMI 38.03 kg/m²     General Alert, no distress, well-nourished  Cardiovascular: Warm, appears well perfused   Respiratory: Easy, non-labored respiratory pattern   Extremities: Upper and lower extremities are atraumatic in appearance without deformity. No swelling or erythema. Neurologic  Mental status:   orientation to person, place, time, situation   Attention intact as able to attend well to the exam     Language fluent in conversation   Comprehension intact; follows simple commands    Cranial nerves:   CN2: Visual fields full w/o extinction on confrontational testing   CN 3,4,6: Pupils equal and reactive to light, extraocular muscles intact  CN5: Facial sensation symmetric   CN7: Face symmetric bilaterally   CN8: Hearing symmetric to spoken voice  CN9: Palate elevated symmetrically  CN11: Traps full strength on shoulder shrug  CN12: Tongue midline with protrusion    Motor Exam:   R  L    Deltoid 5  5   Biceps 5 5   Triceps 5 5   Wrist extension  0 5   Interossei 0 5      R  L    Hip flexion  5 5   Knee flexion  5 5   Knee extension  5 5   Ankle dorsiflexion  5 5   Ankle plantar flexion  5 5     Deep tendon reflexes:    R  L    Biceps  1 1   Brachioradialis  1 1   Patellar  1 1     Sensory: light touch intact and symmetric in all 4 extremities. No sensory extinction on bilateral simultaneous stimulation  Tone: normal in all 4 extremities      NIHSS: 1    IMAGES:  Images personally reviewed and agree w/ radiology interpretation. Head CT w/o Contrast:  CT HEAD WO CONTRAST 10/22/2021    Narrative  EXAMINATION:  CT OF THE HEAD WITHOUT CONTRAST  10/22/2021 1:09 am    Impression  No acute intracranial hemorrhage, mass effect, midline shift, or  hydrocephalus.   No loss of the gray-white junction to indicate site of  territorial infarct. CTA is ordered. Critical results were called by Dr. Antonio Chan MD to Dr Puma Garcia on  10/22/2021 at 01:49. Reportedly has right-sided body symptoms with  hyperacute worsening. CTA of Head / Neck w/ Contrast:  CTA HEAD NECK W CONTRAST 10/22/2021    Narrative  EXAMINATION:  CTA OF THE HEAD AND NECK WITH CONTRAST 10/22/2021 1:08 am:      Impression  1. No definite large vessel occlusion given suboptimal contrast  opacification of the large arteries in the head and neck. 2.  Visualized lungs demonstrate ground-glass opacities with smooth  interlobular septal thickening which may relate to pulmonary edema. Critical results were called by Dr. Owen Gonzalez to Dr. Puma Garcia  on 10/22/2021 at 01:58. This scan was analyzed using [employer]. DuraSweeper contact LVO. Identification of suspected  findings is not for diagnostic use beyond notification. Viz LVO is limited to  analysis of imaging data and should not be used in-lieu of full patient  evaluation or relied upon to make or confirm diagnosis. LABS:  All results below personally reviewed. Pertinent positives & negatives are addressed in Impression & Recommendations below.    Recent Labs     10/22/21  0100 10/22/21  0535    136   K 3.6 3.4*   CL 99 98*   CO2 28 26   BUN 18 16   CREATININE 1.1 1.0   GLUCOSE 218* 215*   CALCIUM 9.3 8.5   LABALBU 4.1  --    MG  --  1.60*   ALKPHOS 106  --    ALT 28  --    AST 18  --      Recent Labs     10/22/21  0100 10/22/21  0535   WBC 13.2* 11.1*   RBC 5.00 4.66   HGB 14.7 13.6   HCT 44.6 41.9    163   INR 1.37*  --      Lab Results   Component Value Date    INR 1.37 10/22/2021    COVID19 Not Detected 10/22/2021     Lipid Panel: pending    HgA1C: pending      CURRENT SCHEDULED MEDICATIONS:   [START ON 10/23/2021] aspirin  81 mg Oral Daily    Or    [START ON 10/23/2021] aspirin  300 mg Rectal Daily    atorvastatin  80 mg Oral Nightly    insulin lispro  0-6 Units SubCUTAneous TID WC    insulin lispro  0-3 Units SubCUTAneous Nightly     dextrose      ondansetron, 4 mg, Q8H PRN   Or  ondansetron, 4 mg, Q6H PRN  polyethylene glycol, 17 g, Daily PRN  perflutren lipid microspheres, 1.5 mL, ONCE PRN  labetalol, 10 mg, Q10 Min PRN  glucose, 15 g, PRN  dextrose, 12.5 g, PRN  glucagon (rDNA), 1 mg, PRN  dextrose, 100 mL/hr, PRN         IMPRESSION & RECOMMENDATIONS     IMPRESSION: 60year old man with PMH CHF, DM who presented with acute right hand weakness consistent with stroke. tPA was administered his symptoms did not improve, unfortunately. RECOMMENDATIONS:  Stroke Plan s/p IV tPA   Imaging / Labs:  -Need 24 hour follow up imaging (Safety Scan) - If MRI scheduled +/- 6 hours can discontinue head CT  -Obtain MRI of the brain w/o contrast to eval for stroke  -Echocardiogram with bubble - if not previously completed   -Check lipid panel and hemoglobin A1C if not already completed     Medications:  -High-intensity statin   -Start ASA 81mg if no hemorrhagic conversion noted on 24 hour follow up scan. If hemorrhage present will provide further instructions on when safe to resume. -SCDs for DVT prophylaxis  -Start DVT chemoprophylaxis if no hemorrhagic conversion noted on 24 hour follow up scan.      Consults / [facility] to help prevent aspiration  [facility] Neurologic Exams & Vitals  -NIHSS per guidelines   -Telemetry   -PT/OT: eval and treat  -PMR consult if rehab recommended   -ST: eval and treat and dysphagia screen  -Nursing bedside swallow prior to any PO intake   -Groin checks per post procedure guidelines  -Blood Pressure Management   -s/p IV tPA alone - Keep SBP <180      Follow up / Discharge Recommendations:  -If no obvious source of stroke identified will need 30 day event monitor arranged at discharge  -Stroke Education at Discharge  -Follow up w/ Neurology in 3 months         [address] [facility], APRN - CNP Neurology & Neurocritical Care   Neurology Line: [phone]  Perfect[employer]: [facility] Neurology & Neuro Critical Care NPs  10/22/2021 7:12 AM

## 2021-10-22 NOTE — PROGRESS NOTES
ICU Progress Note    Admit Date: 10/22/2021  Day: 1  Vent Day: None  IV Access:Peripheral  IV Fluids:None  Vasopressors:None                Antibiotics: None  Diet: No diet orders on file    CC: Right upper extremity weakness    Interval history: No acute overnight events. HPI:  60year old male patient with PMH CHF, DM2 presenting with RUE weakness. He was driving home from work after stopping at [employer], and upon stepping out of his car noticed that he couldn't raise his right arm and also felt numbness/tingling. He had no other neurological deficits at that time. This was around 10:40 p.m. He called his sister who was a physician who advised him to go to the hospital. He drove himself using his left hand. His initial NIHSS 2-3. After CT, patient was unable to transfer himself to [facility] because his right leg became weak as well. Patient was within TPA window and with discussion patient was agreeable to TPA administered at 2:10 a.m. He was subsequently transferred to ICU at [facility]. Endorses nausea, denies fever, chills, lightheadedness, chest pain, shortness of breath, abdominal pain, constipation, diarrhea. Denies smoking, drinking, drug use. Works as a  and lives alone. Father recently passed. Patient has a wound on sole of left foot from blister that ruptured after walking on hot pavement. Patient is vaccinated with Cardoso Ser x2. He states that he takes furosemide, lisinopril, and metoprolol.     In ICU, patient was hypertensive to 156/126. He had troponin elevation of 0.08 and BNP 2399. Glu 207. Electrolytes wnl. Leukocytosis of 13.2. INR 1.37. CXR significant for multifocal patchy opacities; pulmonary edema versus multifocal pneumonia.     Medications:     Scheduled Meds:   [START ON 10/23/2021] aspirin  81 mg Oral Daily    Or    [START ON 10/23/2021] aspirin  300 mg Rectal Daily    atorvastatin  80 mg Oral Nightly    insulin lispro  0-6 Units SubCUTAneous TID     insulin lispro  0-3 Units SubCUTAneous Nightly     Continuous Infusions:   dextrose       PRN Meds:ondansetron **OR** ondansetron, polyethylene glycol, perflutren lipid microspheres, labetalol, glucose, dextrose, glucagon (rDNA), dextrose    Objective:   Vitals:   T-max:  Patient Vitals for the past 8 hrs:   BP Temp Temp src Pulse Resp SpO2 Height Weight   10/22/21 0630 (!) 129/97   83 22 95 % 6' (1.829 m) 280 lb 6.8 oz (127.2 kg)   10/22/21 0615 (!) 131/102   82 23 96 %     10/22/21 0545 (!) 140/104   86 28 97 %     10/22/21 0515 (!) 125/92   84 25 95 %     10/22/21 0500    83       10/22/21 0445 (!) 131/95   85 24      10/22/21 0430 (!) 154/98   88 (!) 31 97 %     10/22/21 0415 (!) 145/98   83 29 95 %     10/22/21 0400 (!) 156/126 98 °F (36.7 °C) Oral 90 22 97 %     10/22/21 0345 (!) 142/96   88 21 95 %     10/22/21 0330 (!) 121/99 97.5 °F (36.4 °C) Axillary 88 24 95 %         Intake/Output Summary (Last 24 hours) at 10/22/2021 0718  Last data filed at 10/22/2021 0615  Gross per 24 hour   Intake 150 ml   Output 550 ml   Net -400 ml       Review of Systems   Constitutional: Positive for activity change. Negative for appetite change, fatigue and fever. HENT: Negative for facial swelling and hearing loss. Eyes: Negative for photophobia and visual disturbance. Respiratory: Negative for apnea, cough, choking, chest tightness and shortness of breath. Cardiovascular: Positive for leg swelling. Negative for chest pain. Musculoskeletal: Negative for neck pain and neck stiffness. Skin: Positive for wound. Negative for pallor. Left foot   Neurological: Positive for weakness and numbness. Negative for tremors, facial asymmetry, speech difficulty, light-headedness and headaches. Tingling sensation in the right arm, though sensations on the rt arm are intact. Able to keep right arm against the gravity but profound weakness of the right hand.    Psychiatric/Behavioral: Negative for behavioral problems and confusion. Physical Exam  Constitutional:       Appearance: Normal appearance. He is not ill-appearing. HENT:      Nose: Nose normal. No congestion or rhinorrhea. Mouth/Throat:      Pharynx: No oropharyngeal exudate or posterior oropharyngeal erythema. Eyes:      Extraocular Movements: Extraocular movements intact. Conjunctiva/sclera: Conjunctivae normal.      Pupils: Pupils are equal, round, and reactive to light. Cardiovascular:      Rate and Rhythm: Normal rate and regular rhythm. Pulses: Normal pulses. Heart sounds: Normal heart sounds. Pulmonary:      Effort: Pulmonary effort is normal.   Musculoskeletal:         General: Signs of injury present. Right lower leg: Edema present. Left lower leg: Edema present. Comments: At the base of the foot, on the head of the left metatarsal.   Skin:     General: Skin is warm. Neurological:      Mental Status: He is alert and oriented to person, place, and time. Psychiatric:         Mood and Affect: Mood normal.         Behavior: Behavior normal.         Thought Content: Thought content normal.         Judgment: Judgment normal.                   LABS:    CBC:   Recent Labs     10/22/21  0100 10/22/21  0535   WBC 13.2* 11.1*   HGB 14.7 13.6   HCT 44.6 41.9    163   MCV 89.1 89.9     Renal:    Recent Labs     10/22/21  0100 10/22/21  0535    136   K 3.6 3.4*   CL 99 98*   CO2 28 26   BUN 18 16   CREATININE 1.1 1.0   GLUCOSE 218* 215*   CALCIUM 9.3 8.5   MG  --  1.60*   ANIONGAP 11 12     Hepatic:   Recent Labs     10/22/21  0100   AST 18   ALT 28   BILITOT 2.2*   PROT 7.4   LABALBU 4.1   ALKPHOS 106     Troponin:   Recent Labs     10/22/21  0100 10/22/21  0535   TROPONINI 0.08* 0.07*     BNP: No results for input(s): BNP in the last 72 hours. Lipids: No results for input(s): CHOL, HDL in the last 72 hours.     Invalid input(s): LDLCALCU, TRIGLYCERIDE  ABGs:  No results for input(s): PHART, NNR4TKV, PO2ART, IVL2XWG, BEART, THGBART, C7DCSAOP, OVY5LOC in the last 72 hours. INR:   Recent Labs     10/22/21  0100   INR 1.37*     Lactate: No results for input(s): LACTATE in the last 72 hours. Cultures:  -----------------------------------------------------------------  RAD:   CT head without contrast    (Results Pending)   MRI BRAIN WO CONTRAST    (Results Pending)         Assessment/Plan:   The patient  is a 60years old male who presented with right upper extremity weakness that progressed to right lower extremity weakness he was transferred to the ICU following administration of TPA around 2:10 AM    TIA versus CVA  -Right upper extremity weakness, that progressed to rt lower extremity weakness. -CT scanning of the head showed no acute intracranial hemorrhage, mass-effect, midline shift or hydrocephalus with no loss of gray-white junction to indicate site of territorial infarct. -CT of the head and neck showed no definite large vessel occlusion.   Lungs demonstrated groundglass opacities with smooth interlobular septal thickening which may relate to pulmonary edema.  -Last known normal was around 10:40 PM, patient received TPA at 2:10 AM  -Plan to keep blood pressure under 185/105  -Patient n.p.o., ordered SLP/PT/OT  -Aspirin following repeat CT scan 24 hours after administration of TPA  -Follow-up with lipid panel, follow-up with HbA1c  -Inpatient consult to neurology  -Atorvastatin 80 mg  - F/U brain MRI        Mild CHF exacerbation  -Patient with bilateral pitting edema on exam  -Troponin 0 0.08  -proBNP 2399  -Evidence of pulmonary edema on chest x-ray  -However patient denies any chest pain or shortness of breath  -Plan  -The patient received 1 dose of 20 mg of Lasix  - Will avoid strict lowering of the blood pressure.   -We will be following up on EKG and trending troponins, BMP      Type 2 diabetes mellitus  -Patient takes Metformin at home  -POCT q4h  with low-dose sliding scale insulin  - Hypoglycemia protocol. Full thickness ulceration of the left foot:  - patient has hx of T2DM. - Podiatry consulted. - F/U XR of left foot shows soft tissue defect.   - No wound culture obtained because of no sign of acute infection, leukocytosis seems to be trending down        Will follow up with podiatry's recs. - Patient on COVID-19 precautions till he is ruled out.      Code Status: Full  FEN: NPO  PPX: None  DISPO: ICU    Brianna Guidry MD, PGY-1  10/22/21  7:18 AM    This patient has been staffed and discussed with Dr. Mesha Mckeon MD.

## 2021-10-22 NOTE — PROGRESS NOTES
Clinical Pharmacy Progress Note    All IV in NS - Management by Pharmacy    Consult Date(s): 10/22/21  Consulting Provider(s): Timothy    Assessment / Plan    Stroke  All IVs in Normal Saline   Drips will be adjusted to normal saline as appropriate based on compatibility, in an effort to avoid fluid shifts, as D5W is osmotically active.  The following intermittent IV drips / infusions have been adjusted to saline:  o None at this time   The following medications must remain in D5W due to incompatibility with normal saline:  o Amiodarone Infusion  o Amphotericin  o Mycophenolate  o Nitroprusside  o Penicillin G Potassium   Note: Patient has D5W ordered as part of hypoglycemia orderset.  Total IV fluid delivered to patient over last 24 hrs: ~150 mL   RPh will follow daily to ensure all IVPBs / drips are in NS where possible. Thank you for consulting Pharmacy!     Kristal Leo PharmD, [facility]  Wireless: [phone]  10/22/2021 11:12 AM

## 2021-10-22 NOTE — ED NOTES
CC: Stroke    Date of evaluation: 10/22/2021    Chief Complaint     Nursing Notes, Past Medical Hx, Past Surgical Hx, Social Hx, Allergies, and Family Hx were reviewed. History of Present Illness     Melva Brumfield is a 60 y.o. male who presents to transfer from [address] [place] [address] for a stroke. Last known well time was 10:30 PM.  The patient got out of his car and noticed weakness and numbness of his right upper extremity. He presented to [address] [place] [address] where stroke team was consulted, head CT and CT angiogram were obtained. He did not have a large vessel occlusion seen. Head CT did not show any intracranial hemorrhage. He was given TPA at the outside hospital after consultation with the stroke team.  [facility] transferred him to [facility].  He was initially accepted as a direct admission to the ICU. However, the ICU bed was not initially available so he initially came to the emergency department for a brief time. The patient had developed some weakness in his right lower extremity as well that has improved. In the time since he has received TPA he has had some improvement in his strength in his right upper extremity. Review of Systems     Review of Systems   All other systems reviewed and are negative. Past Medical, Surgical, Family, and Social History     He has a past medical history of CHF (congestive heart failure) ([facility] [address] and Diabetes mellitus ([facility] [address] He has no past surgical history on file. His family history is not on file. He reports previous alcohol use. He reports that he does not use drugs. Medications   There are no discharge medications for this patient. Allergies     He has No Known Allergies. Physical Exam     INITIAL VITALS: There were no vitals taken for this visit. Physical Exam  Vitals reviewed. Constitutional:       General: He is not in acute distress. Appearance: Normal appearance. He is not ill-appearing, toxic-appearing or diaphoretic.    HENT: Head: Normocephalic and atraumatic. Mouth/Throat:      Mouth: Mucous membranes are dry. Eyes:      Extraocular Movements: Extraocular movements intact. Pupils: Pupils are equal, round, and reactive to light. Comments: No visual field deficit   Cardiovascular:      Rate and Rhythm: Normal rate and regular rhythm. Pulses: Normal pulses. Heart sounds: Normal heart sounds. Pulmonary:      Effort: Pulmonary effort is normal.      Breath sounds: Normal breath sounds. Abdominal:      Palpations: Abdomen is soft. Tenderness: There is no abdominal tenderness. Musculoskeletal:         General: Normal range of motion. Cervical back: Normal range of motion and neck supple. Right lower leg: No edema. Left lower leg: No edema. Skin:     General: Skin is warm and dry. Capillary Refill: Capillary refill takes less than 2 seconds. Neurological:      Mental Status: He is alert and oriented to person, place, and time. Comments: Drift but not fully down to bed of his right upper extremity, subjectively decreased sensation of right upper extremity, no aphasia or dysarthria, no facial droop, no visual field deficit, normal strength and sensation of bilateral lower extremities and left upper extremity, no ataxia   Psychiatric:         Mood and Affect: Mood normal.         Behavior: Behavior normal.         Diagnostic Results         RADIOLOGY:  No orders to display         LABS:   Labs Reviewed - No data to display    BEDSIDE ULTRASOUND:      VITALS:   ,  ,  ,       Procedures         ED Course     The patient was given the followingmedications:  No orders of the defined types were placed in this encounter. CONSULTS:  None    MEDICAL DECISION MAKING     Hannah Morrison is a 60 y.o. male who presented after receiving TPA at an outside hospital while awaiting availability of an ICU bed. He was only in the emergency department for a very brief timeframe. However, on my assessment, he was hemodynamically stable, well-appearing and in no acute distress. His strength was diminished in his right upper extremity but this was improved from when he received TPA at the outside hospital.  He is currently admitted in the ICU.     Clinical Impression     Acute ischemic stroke     /Plan     DISPOSITION    Admission to ICU     Jayson Valdovinos MD  10/22/21 [id number]

## 2021-10-22 NOTE — PROGRESS NOTES
Speech Language Pathology    Order received and chart reviewed, d/w RN. Attempted to see pt to initiate evaluations but pt currently getting procedure in room. Will re-attempt at later time / date as pt available and schedule permits.     Gianni Malik MA CCC-SLP; [id number]  Speech-Language Pathologist  Pager # [phone]  Phone # [phone]  Office # [phone]

## 2021-10-22 NOTE — ED PROVIDER NOTES
Emergency Department provider note    CHIEF COMPLAINT  Right arm weakness    HISTORY OF PRESENT ILLNESS  Jason Lea is a 60 y.o. male  who presents to the ED after feeling like his right arm was out of control, not working right, he also notes some change in sensation, tingly feeling in the right arm. No lower extremity numbness or weakness on arrival, last known well was 10:40 PM, he had been driving for only about 15 minutes, got out of the car and noticed his arm did not work, states he was even driving with the right hand and it was working normally the,, no difficulty walking, no chest pain or shortness of breath, he stated that heavy breathing was from anxiety, he does have a history of CHF, had not been taking his medication regularly since he was having insurance issues, and he thinks that is why he has had recent swelling, supposed be on Lasix, he does have it now, denies recent cough or fever, states he was feeling fine up until tonight when this happened, had a normal day today, no trauma or injury, no headache or vision changes, no seizures, no speech changes or facial droop, he is feeling a little nauseous, feeling very anxious because he does not know what is going on, no vomiting/bowel changes nor urinary changes, no abdominal pain, no dysphagia, has been vaccinated for Covid and no known exposures recently, he is not on any blood thinners, no bleeding disorders, he is on hypertension meds, no dizziness/spinning sensation, he is right-handed, no other complaints, modifying factors or associated symptoms. I have reviewed the following from the nursing documentation. No past medical history on file. No past surgical history on file. No family history on file.   Social History     Socioeconomic History    Marital status: Single     Spouse name: Not on file    Number of children: Not on file    Years of education: Not on file    Highest education level: Not on file   Occupational History    Not on file   Tobacco Use    Smoking status: Not on file   Substance and Sexual Activity    Alcohol use: Not Currently    Drug use: Not on file    Sexual activity: Not on file   Other Topics Concern    Not on file   Social History Narrative    Not on file     Social Determinants of Health     Financial Resource Strain:     Difficulty of Paying Living Expenses:    Food Insecurity:     Worried About Running Out of Food in the Last Year:     [address] in the Last Year:    Transportation Needs:     Lack of Transportation (Medical):  Lack of Transportation (Non-Medical):    Physical Activity:     Days of Exercise per Week:     Minutes of Exercise per Session:    Stress:     Feeling of Stress :    Social Connections:     Frequency of Communication with Friends and Family:     Frequency of Social Gatherings with Friends and Family:     Attends Jainism Services:     Active Member of Clubs or Organizations:     Attends Club or Organization Meetings:     Marital Status:    Intimate Partner Violence:     Fear of Current or Ex-Partner:     Emotionally Abused:     Physically Abused:     Sexually Abused:      Current Facility-Administered Medications   Medication Dose Route Frequency Provider Last Rate Last Admin    sodium chloride flush 0.9 % injection 5-40 mL  5-40 mL IntraVENous 2 times per day Shell Singleton DO        sodium chloride flush 0.9 % injection 5-40 mL  5-40 mL IntraVENous PRN Shell Singleton, DO        0.9 % sodium chloride infusion  25 mL IntraVENous PRN Shell Singleton DO        alteplase (ACTIVASE) injection 81 mg  81 mg IntraVENous Once Shell Singleton DO 81 mL/hr at 10/22/21 0219 81 mg at 10/22/21 0219    Followed by    0.9 % sodium chloride bolus  50 mL IntraVENous Once Shell Singleton DO        dextrose 50 % IV solution  12.5 g IntraVENous Once PRN Shell Singleton DO         No current outpatient medications on file.      No Known Allergies    REVIEW OF SYSTEMS  10 systems reviewed, pertinent positives per HPI otherwise noted to be negative. PHYSICAL EXAM  BP (!) 133/94   Pulse 98   Temp 98.4 °F (36.9 °C) (Oral)   Resp 24   Ht 6' (1.829 m)   Wt 280 lb 6.8 oz (127.2 kg)   SpO2 95%   BMI 38.03 kg/m²   GENERAL APPEARANCE: Awake and alert. Cooperative. No acute distress. HEAD: Normocephalic. Atraumatic. EYES: PERRL. EOM's grossly intact. Mildly injected conjunctival bilaterally  ENT: Mucous membranes are tacky/dry, airway patent, no stridor, no otorrhea or rhinorrhea, no epistaxis. Passed bedside swallow eval  NECK: Supple. No rigidity, no JVD  HEART: RRR. No murmurs. LUNGS: Respirations slightly tachypneic in the 20s. With coarse breath sounds, few crackles in the bases. Good air exchange. Speaking comfortably in full sentences. ABDOMEN: Soft. Non-distended. Non-tender. No masses. No organomegaly. No guarding or rebound. Rotund abdomen  EXTREMITIES: 1+ lower extremity pitting peripheral edema. Right upper extremity weakness and paresthesia  SKIN: Warm and dry. No acute rashes. NEUROLOGICAL: Alert and oriented x4. Abnormal finger-to-nose testing on right, but presumably due to weakness, please see NIH Stroke Scale below. Cranial nerves II through XII grossly intact, no ataxic gait  PSYCHIATRIC: Normal mood and affect. A little anxious    NIH Stroke Scale     Interval: Baseline  Time: 0045  Person Administering Scale: Sean Singh DO       1a  Level of consciousness: 0=alert; keenly responsive   1b. LOC questions:  0=Performs both tasks correctly   1c. LOC commands: 0=Performs both tasks correctly   2. Best Gaze: 0=normal   3. Visual: 0=No visual loss   4. Facial Palsy: 0=Normal symmetric movement   5a. Motor left arm: 0=No drift, limb holds 90 (or 45) degrees for full 10 seconds   5b.   Motor right arm: 1=Drift, limb holds 90 (or 45) degrees but drifts down before full 10 seconds: does not hit bed   6a. motor left le=No drift, limb holds 90 (or 45) degrees for full 10 seconds   6b  Motor right le=No drift, limb holds 90 (or 45) degrees for full 10 seconds   7. Limb Ataxia: 1=Present in one limb   8. Sensory: 1=Mild to moderate sensory loss; patient feels pinprick is less sharp or is dull on the affected side; there is a loss of superficial pain with pinprick but patient is aware He is being touched   9. Best Language:  0=No aphasia, normal   10. Dysarthria: 0=Normal   11. Extinction and Inattention: 0=No abnormality   12. Distal motor function: 1=At least some extension after 5 seconds, but is not fully extended. Any movement of the fingers which is not in response to a command is not scored    Total:  4     Modified Rutherford Score - Assessing Disability From Stroke    Score: 2 - Slight disability; unable to carry out all previous activities, but able to look after own affairs without assistance    LABS  I have reviewed all labs for this visit.    Results for orders placed or performed during the hospital encounter of 10/22/21   COVID-19, Rapid   Result Value Ref Range    SARS-CoV-2, NAAT Not Detected Not Detected   CBC Auto Differential   Result Value Ref Range    WBC 13.2 (H) 4.0 - 11.0 K/uL    RBC 5.00 4.20 - 5.90 M/uL    Hemoglobin 14.7 13.5 - 17.5 g/dL    Hematocrit 44.6 40.5 - 52.5 %    MCV 89.1 80.0 - 100.0 fL    MCH 29.4 26.0 - 34.0 pg    MCHC 33.1 31.0 - 36.0 g/dL    RDW 14.3 12.4 - 15.4 %    Platelets 208 153 - 937 K/uL    MPV 9.8 5.0 - 10.5 fL    Neutrophils % 67.1 %    Lymphocytes % 21.2 %    Monocytes % 5.5 %    Eosinophils % 4.8 %    Basophils % 1.4 %    Neutrophils Absolute 8.9 (H) 1.7 - 7.7 K/uL    Lymphocytes Absolute 2.8 1.0 - 5.1 K/uL    Monocytes Absolute 0.7 0.0 - 1.3 K/uL    Eosinophils Absolute 0.6 0.0 - 0.6 K/uL    Basophils Absolute 0.2 0.0 - 0.2 K/uL   Comprehensive Metabolic Panel w/ Reflex to MG   Result Value Ref Range    Sodium 138 136 - 145 mmol/L    Potassium reflex Magnesium 3.6 3.5 - 5.1 mmol/L    Chloride 99 99 - 110 mmol/L    CO2 28 21 - 32 mmol/L    Anion Gap 11 3 - 16    Glucose 218 (H) 70 - 99 mg/dL    BUN 18 7 - 20 mg/dL    CREATININE 1.1 0.9 - 1.3 mg/dL    GFR Non-African American >60 >60    GFR African American >60 >60    Calcium 9.3 8.3 - 10.6 mg/dL    Total Protein 7.4 6.4 - 8.2 g/dL    Albumin 4.1 3.4 - 5.0 g/dL    Albumin/Globulin Ratio 1.2 1.1 - 2.2    Total Bilirubin 2.2 (H) 0.0 - 1.0 mg/dL    Alkaline Phosphatase 106 40 - 129 U/L    ALT 28 10 - 40 U/L    AST 18 15 - 37 U/L    Globulin 3.3 Not Established g/dL   Troponin   Result Value Ref Range    Troponin 0.08 (H) <0.01 ng/mL   Protime-INR   Result Value Ref Range    Protime 15.7 (H) 9.9 - 12.7 sec    INR 1.37 (H) 0.88 - 1.12   APTT   Result Value Ref Range    aPTT 28.4 26.2 - 38.6 sec   Brain Natriuretic Peptide   Result Value Ref Range    Pro-BNP 2,399 (H) 0 - 124 pg/mL   POCT Glucose   Result Value Ref Range    POC Glucose 207 (H) 70 - 99 mg/dl    Performed on ACCU-CHEK        NIH Stroke Scale      Interval: Just prior to TPA  Time: 3:08 AM  Person Administering Scale: Dub Organ, DO        1a  Level of consciousness: 0=alert; keenly responsive   1b. LOC questions:  0=Performs both tasks correctly   1c. LOC commands: 0=Performs both tasks correctly   2. Best Gaze: 0=normal   3. Visual: 0=No visual loss   4. Facial Palsy: 0=Normal symmetric movement   5a. Motor left arm: 0=No drift, limb holds 90 (or 45) degrees for full 10 seconds   5b. Motor right arm: 3=No effort against gravity, limb falls   6a. motor left le=No drift, limb holds 90 (or 45) degrees for full 10 seconds   6b  Motor right le=Drift, limb holds 90 (or 45) degrees but drifts down before full 10 seconds: does not hit bed   7. Limb Ataxia: 1=Present in one limb   8.   Sensory: 1=Mild to moderate sensory loss; patient feels pinprick is less sharp or is dull on the affected side; there is a loss of superficial pain with pinprick but patient is aware He is being touched   9. Best Language:  0=No aphasia, normal   10. Dysarthria: 0=Normal   11. Extinction and Inattention: 0=No abnormality   12. Distal motor function: 2=No voluntary extension after 5 seconds. Movement of the fingers at another time are not scored    Total:   8     NIH Stroke Scale      Interval: 45 min post treatment  Time: 0245  Person Administering Scale: Yaima Cagle       1a  Level of consciousness: 0=alert; keenly responsive   1b. LOC questions:  0=Performs both tasks correctly   1c. LOC commands: 0=Performs both tasks correctly   2. Best Gaze: 0=normal   3. Visual: 0=No visual loss   4. Facial Palsy: 0=Normal symmetric movement   5a. Motor left arm: 0=No drift, limb holds 90 (or 45) degrees for full 10 seconds   5b. Motor right arm: 1=Drift, limb holds 90 (or 45) degrees but drifts down before full 10 seconds: does not hit bed   6a. motor left le=No drift, limb holds 90 (or 45) degrees for full 10 seconds   6b  Motor right le=No drift, limb holds 90 (or 45) degrees for full 10 seconds   7. Limb Ataxia: 1=Present in one limb   8. Sensory: 1=Mild to moderate sensory loss; patient feels pinprick is less sharp or is dull on the affected side; there is a loss of superficial pain with pinprick but patient is aware He is being touched   9. Best Language:  0=No aphasia, normal   10. Dysarthria: 0=Normal   11. Extinction and Inattention: 0=No abnormality   12. Distal motor function: 1=At least some extension after 5 seconds, but is not fully extended. Any movement of the fingers which is not in response to a command is not scored    Total:   4       EKG Interpretation    Interpreted by emergency department physician  Normal sinus rhythm, rate 99, normal axis, minimal QT prolongation at 467, no ST segment or T wave changes indicative of acute ischemia    RADIOLOGY    XR CHEST PORTABLE   Final Result   Multifocal patchy opacities.   Pulmonary edema versus multifocal pneumonia. CTA HEAD NECK W CONTRAST   Final Result   1. No definite large vessel occlusion given suboptimal contrast   opacification of the large arteries in the head and neck. 2.  Visualized lungs demonstrate ground-glass opacities with smooth   interlobular septal thickening which may relate to pulmonary edema. Critical results were called by Dr. Taylor Gonzalez to Dr. Chapincito Mauricio   on 10/22/2021 at 01:58. This scan was analyzed using Viz. ai contact LVO. Identification of suspected   findings is not for diagnostic use beyond notification. Viz LVO is limited to   analysis of imaging data and should not be used in-lieu of full patient   evaluation or relied upon to make or confirm diagnosis. CT HEAD WO CONTRAST   Final Result   No acute intracranial hemorrhage, mass effect, midline shift, or   hydrocephalus. No loss of the gray-white junction to indicate site of   territorial infarct. CTA is ordered. Critical results were called by Dr. Cinda Gomez MD to Dr Chapincito Mauricio on   10/22/2021 at 01:49. Reportedly has right-sided body symptoms with   hyperacute worsening. TIMES:  Last Known Well: 2240  Arrival to ED: 0038  tPA Administration Time: 0210  Stroke Team: Case discussed with  Stroke Team, Dr. Josiah Ellis at 2999. Reason for Delays:  [] Social/[facility]  [] Initial refusal of testing or treatment  [] Care team unable to identify eligibility  [x] Hypertension requiring aggressive control with IV medications  [] Further diagnostic evaluation to confirm stroke for patients with hypoglycemia (blood glucose <50), seizures, or major metabolic disorders  [] Management of concomitant emergent/acute conditions such as cardiopulmonary arrest, respiratory failure (requiring intubation)  [] Investigational or experimental protocol for thrombolysis    ED COURSE/MDM  Patient seen and evaluated. Old records reviewed. Labs and imaging reviewed and results discussed. 58-year-old male with acute right arm weakness and paresthesia, initially NIH stroke scale 4, by the time he had his CT scan, he could not get up and walk, had some weakness in the right lower extremity as well, as well as significant worsening of the weakness in the right upper extremity, up to NIH stroke scale of 8, symptoms consistent with left MCA lesion stroke, no LVO on CTA, no obvious stroke on CT at this time, but this was acute onset and he was within TPA window, after consultation with  stroke team and acute lowering of blood pressure since his diastolic was greater than 105, he was given labetalol and that was noted improvement, after risk-benefit discussion, shared decision making, patient was agreeable to TPA, and I agree with this plan given his symptoms would be debilitating if they persisted, incidentally patient appears to be having acute CHF exacerbation, he did admit to recent swelling in lower extremities, though he did appear dyspneic on exam he stated he was not having any shortness of breath or chest pain, elevated troponin, but he was anticoagulated with the TPA, at one point during his ED stay he was noted to have O2 sat of 85% on room air with good waveform, this is not surprising given the pulmonary edema appearance on CT, he was placed on 4 L O2 nasal cannula and remained in the mid 90s after that, just prior to transport he had noted improvement in weakness, back to the original NIH of 4, there was some confusion regarding bed availability and air care transport arrived prior to bed assignment, they ultimately made a bed for him at the ICU at [facility], but he had to stop in the ED to hold briefly while they were preparing this, see ED course below for more information.   ED Course as of Oct 22 0401   Fri Oct 22, 2021   0102 I spoke to Dr. Jackelin West with [facility] stroke team, discussed case and we will consider TPA if CT head negative for bleed and symptoms persist and if he is right-handed, I explained I did think this would be potentially disabling if it continues. Blood pressure would need improvement as well.    [SY]   0142 Radiology tech asked for assistance because the patient could no longer walk when getting off the CT table, right lower extremity weakness now noted and worsening right upper extremity weakness.    [SY]   0145 Spoke to Dr. Nneka Anne radiology he reported no acute process, no hemorrhage on CT head noncontrast.    [SY]   0147 He was not having chest pain/SOB. Does have CHF   Troponin(!): 0.08 [SY]   0149 TPA delayed d/t his blood pressure elevation.    [SY]   0151 Given zofran for nausea, patient very anxious at this time.    [SY]   0200 Since blood pressure had improved, giving TPA.    [SY]   0201 Spoke to Dr. Naldo Peacock radiology he reported no LVO, no acute process on CTA.    [SY]   0224 Dr. Shivani Martinez neurology with [facility] called back, she was told there were no ICU beds at [facility], he was upset that he had been called unnecessarily, he told transfer center to figure out if there were ICU beds available and to call him back. [SY]   8856 Repeated been a stroke scale back to floor, notable improvement in his right upper extremity and right lower extremity. He felt like the right lower extremity was getting back to normal, right upper extremity still weak but with drift only, could hold against gravity. Patient was appreciative of care and was feeling much better.    [SY]   48 [phone] Spoke to Dr. Shivani Martinez again with neurology, he agreed to consult on patient after further discussion regarding care.     [SY]   4196 I spoke to Dr. Kole Dean hospitalist at [facility], she agreed to accept to [facility] ICU for admission, pending bed cleaning, patient will stop in the ED prior to this, I spoke to Dr. Avery Gray there in the ED who agreed to accept for temporary hold there.    [SY]      ED Course User Index  [SY] Ventura Priest,      Orders Placed This Encounter   Procedures    COVID-19, Rapid    XR CHEST PORTABLE    CT HEAD WO CONTRAST    CTA HEAD NECK W CONTRAST    CBC Auto Differential    Comprehensive Metabolic Panel w/ Reflex to MG    Troponin    Protime-INR    APTT    Brain Natriuretic Peptide    Diet NPO    Swallow screen by nursing before diet and oral medications started    NIH Stroke Scale (NIHSS)    Vital signs during and post alteplase (tPA)    Notify physician prior to alteplase (tPA)    Notify physician during and post alteplase (tPA)    Notify physician during and post alteplase (tPA)    Bedrest during and post alteplase (tPA)    Elevate HOB during and post alteplase (tPA)    Pulse Oximetry    Implement alteplase (tPA) hemorrhage/bleeding precautions    Avoid antiplatelet and antithrombotic medications for 24 hours post administration of alteplase (tPA)    No Anticoagulants for 24 hours after alteplase (tPA)    Implement suspected intracerebral hemorrhage (ICH) guidelines    Telemetry monitoring - continuous duration    Pharmacy to Dose: Other - See Comments: The pharmacist will review this patient's medication profile to evaluate IV medications and change all base solutions to 0.9% sodium chloride if possible based on compatibility and product availability. This. .. [address] [place] [address] to change base solutions.     Initiate Oxygen Therapy Protocol    POCT Glucose    EKG 12 Lead    Initiate minimum 2 IV lines for alteplase (tPA) infusion    Fall precautions     Orders Placed This Encounter   Medications    ondansetron (ZOFRAN) injection 4 mg    iopamidol (ISOVUE-370) 76 % injection 75 mL    labetalol (NORMODYNE;TRANDATE) injection 20 mg    DISCONTD: sodium chloride flush 0.9 % injection 5-40 mL    DISCONTD: sodium chloride flush 0.9 % injection 5-40 mL    DISCONTD: 0.9 % sodium chloride infusion    FOLLOWED BY Linked Order Group     alteplase (ACTIVASE) injection 9 mg     alteplase (ACTIVASE) injection 81 mg    DISCONTD: 0.9 % sodium chloride bolus    DISCONTD: dextrose 50 % IV solution       I spoke with Dr. Kathya Velasco. We thoroughly discussed the history, physical exam, laboratory and imaging studies, as well as, emergency department course. Based upon that discussion, we've decided to admit Volanda Cushing for further observation and evaluation of Mitali Brown's CVA-like symptoms. As I have deemed necessary from their history, physical and studies, I have considered and evaluated Volanda Cushing for the following diagnoses:  DIABETES, INTRACRANIAL HEMORRHAGE, MENINGITIS, SUBARACHNOID HEMORRHAGE, SUBDURAL HEMATOMA, & STROKE. t-PA given with the following INCLUSION CRITERIA verified (only those checked):  [x] Age 25 years or older  [x] Clinical diagnosis of ischemic stroke causing measurable neurological deficit  [x] Administration of t-PA can be initiated within 3 hours of onset of symptoms  [x] A patient or family members who understand the potential risks and benefits:   Of every 100 patients treated with tPA:   72 will have the same outcome   32 will have a better outcome   3 will have a worse outcome (with 1 being severely disabled or fatal) due to t-PA        CLINICAL IMPRESSION  1. Cerebrovascular accident (CVA), unspecified mechanism ([facility] [address]    2. Acute right-sided weakness    3. Arm paresthesia, right    4. Accelerated hypertension    5. Type 2 diabetes mellitus with hyperglycemia, without long-term current use of insulin (HCC)    6. Elevated troponin    7. Acute on chronic congestive heart failure, unspecified heart failure type ([facility] [address]    8. Hypoxia        Blood pressure (!) 133/94, pulse 98, temperature 98.4 °F (36.9 °C), temperature source Oral, resp. rate 24, height 6' (1.829 m), weight 280 lb 6.8 oz (127.2 kg), SpO2 95 %. DISPOSITION  Volanda Cushing was transferred to [facility] in stable/fair condition. The total critical care time spent while evaluating and treating this patient was 50 minutes.   This excludes time spent doing separately billable procedures. This includes time at the bedside, data interpretation, medication management, obtaining critical history from collateral sources if the patient is unable to provide it directly, and physician consultation. Specifics of interventions taken and potentially life-threatening diagnostic considerations are listed in the medical decision making. Merline Creighton, DO    Comment: Please note this report has been produced using speech recognition software and may contain errors related to that system including errors in grammar, punctuation, and spelling, as well as words and phrases that may be inappropriate. If there are any questions or concerns please feel free to contact the dictating provider for clarification.         Merline Creighton, DO  10/22/21 0233

## 2021-10-22 NOTE — CONSULTS
Clinical Pharmacy Progress Note  Medication History     Admit Date: 10/22/2021    Subjective/Objective:  Asked by Dr. Dannie Hernandez to clarify patient's home medications. List of current medications patient is taking is in process. Source of information: outpatient fill records    Was unable to speak with patient to confirm medication history. Called and spoke with patient's mother and she was not sure what he takes either. Will have someone try to contact patient tomorrow. Please call with any questions.     Alfreda Colvin, PharmD Candidate [id number]

## 2021-10-22 NOTE — PROGRESS NOTES
Speech Language Pathology  Facility/Department: [facility] ICU   CLINICAL BEDSIDE SWALLOW EVALUATION / Discharge    NAME: Christin Hurtado  : 1965  MRN: [id number]    ADMISSION DATE: 10/22/2021  ADMITTING DIAGNOSIS: has Cerebrovascular accident (CVA) due to occlusion of left middle cerebral artery ([facility] [address] Diabetes mellitus ([facility] [address] CHF (congestive heart failure) ([facility] [address] and BMI 38.0-38.9,adult on their problem list.  ONSET DATE: 10/21/21    Recent Chest Xray/CT of Chest: (10/22/21)  Multifocal patchy opacities.  Pulmonary edema versus multifocal pneumonia. Date of Eval: 10/22/2021  Evaluating Therapist: PEDRO Trevino    Current Diet level:  Current Diet : Regular  Current Liquid Diet : Thin      Primary Complaint  Patient Complaint: wants food / drink    Pain:  Denied    Reason for Referral  Christin Hurtado was referred for a bedside swallow evaluation to assess the efficiency of his swallow function, identify signs and symptoms of aspiration and make recommendations regarding safe dietary consistencies, effective compensatory strategies, and safe eating environment. Impression  Dysphagia Diagnosis: Swallow function appears grossly intact  Dysphagia Impression : Pt presents with grossly intact oral and pharyngeal phases. Mastication of regular solid timely and functional with complete oral clearance following liquid rinses. Adequate bolus control and posterior transit with all trials; no significant oral residue noted. Pt consumed 3 oz thins via cup edge and via straw uninterrupted w/o immediate or delayed cough or wet voice. No overt s/s associated with aspiration exhibited with any consistency trialed. Recommend continue regular solids + thin liquids; no further dysphagia intervention indicated.   Dysphagia Outcome Severity Scale: Level 6: Within functional limits/Modified independence     Treatment Plan  Requires SLP Intervention: No  Duration/Frequency of Treatment: No further SLP f/u indicated - please re-consult if concerns for aspiration or dysphagia emerge  D/C Recommendations:  (defer to PT / OT , no further SLP needs)       Recommended Diet and Intervention  Diet Solids Recommendation: Regular  Liquid Consistency Recommendation: Thin  Recommended Form of Meds: PO  Recommendations: Assist feed (RUE weakness)  Therapeutic Interventions:  (n/a)    Compensatory Swallowing Strategies  Compensatory Swallowing Strategies: Upright as possible for all oral intake;Assist feed (general aspiration precautions)    Treatment/Goals  N/A    Pt goal: get better, return to cooking    General  Chart Reviewed: Yes  Comments: Pt presented to [facility] s/p R arm weakness and tingling - concerns for CVA and pt given tPA @ 0200 10/22/21. Pt t/f'd to [facility] for monitoring s/p tPA. PMHx significant for CHF and DM. Per RN documentation, pt passed YSS. Subjective  Subjective: Pt in bed, pleasant and cooperative. Behavior/Cognition: Alert; Cooperative;Pleasant mood  Temperature Spikes Noted: No (afebrile)  Respiratory Status: O2 via nasual cannula  Breath Sounds:  (WDL per flowsheets)  O2 Device: Nasal cannula  Communication Observation: Functional  Follows Directions: Complex  Dentition: Adequate  Patient Positioning: Upright in bed  Baseline Vocal Quality: Normal  Volitional Cough: Strong  Prior Dysphagia History: None per pt or chart review  Consistencies Administered: Reg solid; Dysphagia Pureed (Dysphagia I); Thin - cup; Thin - straw; Ice Chips           Vision/Hearing  Vision  Vision: Impaired  Vision Exceptions: Wears glasses at all times  Hearing  Hearing: Within functional limits    Oral Motor Deficits  Oral/Motor  Oral Motor:  Within functional limits    Oral Phase Dysfunction  Oral Phase  Oral Phase: WFL     Indicators of Pharyngeal Phase Dysfunction   Pharyngeal Phase  Pharyngeal Phase: WFL    Prognosis  Prognosis  Prognosis for safe diet advancement:  (n/a)  Barriers to reach goals:  (n/a)  Individuals consulted  Consulted and agree with results and recommendations: Patient;MD    Education  Patient Education: Educated pt to role of SLP, purpose of evaluation, impact CVA can have on swallow, A&P of swallow, s/s and risks associated with aspiration, results of session, and recommendations  Patient Education Response: Verbalizes understanding  Safety Devices in place: Yes  Type of devices:  All fall risk precautions in place       Therapy Time  SLP Individual Minutes  Time In: 7591  Time Out: 1230  Minutes: 15  SLP Co-Treatment Minutes  Time In: 0000  Time Out: 0000  Minutes: 0  SLP Total Treatment Time  Timed Code Treatment Minutes: 0 Minutes  Total Treatment Time: KACIE Daniel CCC-SLP; [id number]  Speech-Language Pathologist  Pager # [phone]  Phone # [phone]  Office # [phone]

## 2021-10-22 NOTE — DISCHARGE INSTR - COC
Continuity of Care Form    Patient Name: Ivone Bedoya   :  1965  MRN:  [id number]    Admit date:  10/22/2021  Discharge date:  ***    Code Status Order: Full Code   Advance Directives:      Admitting Physician:  Luz Marina Waters MD  PCP: No primary care provider on file. Discharging Nurse: [facility] Unit/Room#: 1319/1491-19  Discharging Unit Phone Number: ***    Emergency Contact:   Extended Emergency Contact Information  Primary Emergency Contact: [address] [facility] Phone: [phone]  Mobile Phone: [phone]  Relation: Parent   needed? No  Secondary Emergency Contact: Anny Pagan Phone: [phone]  Mobile Phone: [phone]  Relation: Brother/Sister   needed? No    Past Surgical History:  No past surgical history on file.     Immunization History:   Immunization History   Administered Date(s) Administered    COVID-19, Moderna, PF, 100mcg/0.5mL 2021, 04/15/2021       Active Problems:  Patient Active Problem List   Diagnosis Code    Acute CVA (cerebrovascular accident) ([facility] [address] I63.9       Isolation/Infection:   Isolation            Contact and Droplet          Patient Infection Status       Infection Onset Added Last Indicated Last Indicated By Review Planned Expiration Resolved Resolved By    COVID-19 Rule Out 10/22/21 10/22/21 10/22/21 COVID-19 (Ordered) 10/29/21 11/05/21      Resolved    COVID-19 Rule Out 10/22/21 10/22/21 10/22/21 COVID-19, Rapid (Ordered)   10/22/21 Rule-Out Test Resulted            Nurse Assessment:  Last Vital Signs: /88   Pulse 85   Temp 98 °F (36.7 °C) (Oral)   Resp 24   Ht 6' (1.829 m)   Wt 280 lb 6.8 oz (127.2 kg)   SpO2 95%   BMI 38.03 kg/m²     Last documented pain score (0-10 scale):    Last Weight:   Wt Readings from Last 1 Encounters:   10/22/21 280 lb 6.8 oz (127.2 kg)     Mental Status:  {IP PT MENTAL STATUS::::0}    IV Access:  [facility] IV ACCESS:473846107:::0}    Nursing Mobility/ADLs:  Walking   {CHP DME ADLs:078718141:::0}  Transfer  {CHP DME ADLs:683521262:::0}  Bathing  {CHP DME ADLs:989411785:::0}  Dressing  {CHP DME ADLs:874932321:::0}  Toileting  {CHP DME ADLs:731118119:::0}  Feeding  {CHP DME ADLs:369639962:::0}  Med Admin  {CHP DME ADLs:422771441:::0}  Med Delivery   { SHIRA MED Delivery:056512258:::0}    Wound Care Documentation and Therapy:        Elimination:  Continence:   · Bowel: {YES / BX:13748}  · Bladder: {YES / AC:01151}  Urinary Catheter: {Urinary Catheter:901584865:::0}   Colostomy/Ileostomy/Ileal Conduit: {YES / UQ:08779}       Date of Last BM: ***    Intake/Output Summary (Last 24 hours) at 10/22/2021 1004  Last data filed at 10/22/2021 0615  Gross per 24 hour   Intake 150 ml   Output 550 ml   Net -400 ml     I/O last 3 completed shifts: In: 150 [P.O.:150]  Out: 550 [Urine:550]    Safety Concerns:     [address] [employer] Safety Concerns:802845578:::0}    Impairments/Disabilities:      [employer] Impairments/Disabilities:538552706:::0}    Nutrition Therapy:  Current Nutrition Therapy:   [employer] Diet List:967805592:::0}    Routes of Feeding: {CHP DME Other Feedings:699221849:::0}  Liquids: {Slp liquid thickness:31072}  Daily Fluid Restriction: {CHP DME Yes amt example:250138511:::0}  Last Modified Barium Swallow with Video (Video Swallowing Test): {Done Not Done ZDHB:655688994:::3}    Treatments at the Time of Hospital Discharge:   Respiratory Treatments: ***  Oxygen Therapy:  {Therapy; copd oxygen:93211:::0}  Ventilator:    { CC Vent List:641395265:::0}     Heart Failure Instructions for Daily Management  Patient was treated for acute on chronic combined systolic and diastolic heart failure. he  will require the following:     Please weigh daily on the same scale and approximately the same time of day. Report weight gain of 3 pounds/day or 5 pounds/week to : chelsea ECHEVERRIA and [facility] [place] [phone].    Please use hospital discharge weight as baseline reference.  Please monitor for signs and symptoms of and report to MD:  o Worsening Heart Failure: sudden weight gain, shortness of breath, lower extremity or general edema/swelling, abdominal bloating/swelling, inability to lie flat, intolerance to usual activity, or cough (especially at night). Report these finding even if no increase in weight.  o Dehydration:  having difficulty or a decrease in urination, dizziness, worsening fatigue, or new onset/worsening of generalized weakness.  Please continue a LOW SODIUM diet and LIMIT fluid intake to 48 - 64 ounces ( 1.5 - 2 liters) per day.  Call facility MD and [facility] / [address] [place] [address] [phone] with any questions or concerns.  Please continue heart failure education to patient and family/support system.  See After Visit Summary for hospital follow up appointment details.  Consider spiritual care referral for support and/or completion of advance directives .  Consider: having the facility MD complete required 7 day follow up, [place] [address] telehealth program if patient agreeable and able to participate and palliative care consult for ongoing goals of care, end of life, and/or chronic disease management discussions.       Rehab Therapies: {THERAPEUTIC INTERVENTION:5719494289}  Weight Bearing Status/Restrictions: [address] [facility] Weight Bearin:::0}  Other Medical Equipment (for information only, NOT a DME order):  {EQUIPMENT:073568119}  Other Treatments: ***    Patient's personal belongings (please select all that are sent with patient):  {CHP DME Belongings:054848247:::0}    RN SIGNATURE:  {Esignature:461072380:::0}    CASE MANAGEMENT/SOCIAL WORK SECTION    Inpatient Status Date: ***    Readmission Risk Assessment Score:  Readmission Risk              Risk of Unplanned Readmission:  8           Discharging to Facility/ Agency   · Name:   · Address:  · Phone:  · Fax:    Dialysis Facility (if applicable) · Name:  · Address:  · Dialysis Schedule:  · Phone:  · Fax:    / signature: {Esignature:507031736:::0}    PHYSICIAN SECTION    Prognosis: {Prognosis:6727544964:::0}    Condition at Discharge: Rajeev Laureano Patient Condition:140377768:::0}    Rehab Potential (if transferring to Rehab): {Prognosis:1173965522:::0}    Recommended Labs or Other Treatments After Discharge: ***    Podiatry Wound Care Discharge Instructions  Please perform every day dressing changes to left lower extremity as follows  -Apply small amount of antibiotic ointment and a bandaid to the wound on the bottom of the left foot  Patient is partial weightbearing to heel only of Left lower extremity in surgical shoe  Please follow-up with Dr. Sarai Dill DPM for wound check within 2 weeks of discharge. Physician Certification: I certify the above information and transfer of Amandeep Christensen  is necessary for the continuing treatment of the diagnosis listed and that he requires {Admit to Appropriate Level of Care:23363:::0} for {GREATER/LESS:005053901} 30 days.      Update Admission H&P: {CHP DME Changes in HandP:124179291:::0}    PHYSICIAN SIGNATURE:  {Esignature:043557071:::0}

## 2021-10-23 ENCOUNTER — APPOINTMENT (OUTPATIENT)
Dept: CT IMAGING | Age: 56
DRG: 045 | End: 2021-10-23
Attending: INTERNAL MEDICINE
Payer: MEDICAID

## 2021-10-23 LAB
ANION GAP SERPL CALCULATED.3IONS-SCNC: 10 MMOL/L (ref 3–16)
BASOPHILS ABSOLUTE: 0.1 K/UL (ref 0–0.2)
BASOPHILS RELATIVE PERCENT: 0.6 %
BUN BLDV-MCNC: 14 MG/DL (ref 7–20)
CALCIUM SERPL-MCNC: 8.1 MG/DL (ref 8.3–10.6)
CHLORIDE BLD-SCNC: 98 MMOL/L (ref 99–110)
CHOLESTEROL, TOTAL: 118 MG/DL (ref 0–199)
CO2: 27 MMOL/L (ref 21–32)
CREAT SERPL-MCNC: 0.8 MG/DL (ref 0.9–1.3)
EOSINOPHILS ABSOLUTE: 0.2 K/UL (ref 0–0.6)
EOSINOPHILS RELATIVE PERCENT: 1.5 %
GFR AFRICAN AMERICAN: >60
GFR NON-AFRICAN AMERICAN: >60
GLUCOSE BLD-MCNC: 178 MG/DL (ref 70–99)
GLUCOSE BLD-MCNC: 200 MG/DL (ref 70–99)
GLUCOSE BLD-MCNC: 229 MG/DL (ref 70–99)
GLUCOSE BLD-MCNC: 230 MG/DL (ref 70–99)
GLUCOSE BLD-MCNC: 252 MG/DL (ref 70–99)
HCT VFR BLD CALC: 42.4 % (ref 40.5–52.5)
HDLC SERPL-MCNC: 31 MG/DL (ref 40–60)
HEMOGLOBIN: 14 G/DL (ref 13.5–17.5)
LDL CHOLESTEROL CALCULATED: 73 MG/DL
LYMPHOCYTES ABSOLUTE: 1.4 K/UL (ref 1–5.1)
LYMPHOCYTES RELATIVE PERCENT: 10.5 %
MAGNESIUM: 2 MG/DL (ref 1.8–2.4)
MCH RBC QN AUTO: 29.7 PG (ref 26–34)
MCHC RBC AUTO-ENTMCNC: 33 G/DL (ref 31–36)
MCV RBC AUTO: 89.8 FL (ref 80–100)
MONOCYTES ABSOLUTE: 0.8 K/UL (ref 0–1.3)
MONOCYTES RELATIVE PERCENT: 5.9 %
NEUTROPHILS ABSOLUTE: 11.1 K/UL (ref 1.7–7.7)
NEUTROPHILS RELATIVE PERCENT: 81.5 %
PDW BLD-RTO: 14.4 % (ref 12.4–15.4)
PERFORMED ON: ABNORMAL
PLATELET # BLD: 159 K/UL (ref 135–450)
PMV BLD AUTO: 10.1 FL (ref 5–10.5)
POTASSIUM SERPL-SCNC: 3.8 MMOL/L (ref 3.5–5.1)
RBC # BLD: 4.72 M/UL (ref 4.2–5.9)
SARS-COV-2, PCR: NOT DETECTED
SODIUM BLD-SCNC: 135 MMOL/L (ref 136–145)
TRIGL SERPL-MCNC: 70 MG/DL (ref 0–150)
VLDLC SERPL CALC-MCNC: 14 MG/DL
WBC # BLD: 13.7 K/UL (ref 4–11)

## 2021-10-23 PROCEDURE — 36415 COLL VENOUS BLD VENIPUNCTURE: CPT

## 2021-10-23 PROCEDURE — 80048 BASIC METABOLIC PNL TOTAL CA: CPT

## 2021-10-23 PROCEDURE — 97116 GAIT TRAINING THERAPY: CPT

## 2021-10-23 PROCEDURE — 6370000000 HC RX 637 (ALT 250 FOR IP): Performed by: STUDENT IN AN ORGANIZED HEALTH CARE EDUCATION/TRAINING PROGRAM

## 2021-10-23 PROCEDURE — 83735 ASSAY OF MAGNESIUM: CPT

## 2021-10-23 PROCEDURE — 97166 OT EVAL MOD COMPLEX 45 MIN: CPT

## 2021-10-23 PROCEDURE — 97535 SELF CARE MNGMENT TRAINING: CPT

## 2021-10-23 PROCEDURE — 97530 THERAPEUTIC ACTIVITIES: CPT

## 2021-10-23 PROCEDURE — 70450 CT HEAD/BRAIN W/O DYE: CPT

## 2021-10-23 PROCEDURE — 99233 SBSQ HOSP IP/OBS HIGH 50: CPT | Performed by: NURSE PRACTITIONER

## 2021-10-23 PROCEDURE — 99223 1ST HOSP IP/OBS HIGH 75: CPT | Performed by: INTERNAL MEDICINE

## 2021-10-23 PROCEDURE — 6370000000 HC RX 637 (ALT 250 FOR IP)

## 2021-10-23 PROCEDURE — 80061 LIPID PANEL: CPT

## 2021-10-23 PROCEDURE — 6370000000 HC RX 637 (ALT 250 FOR IP): Performed by: INTERNAL MEDICINE

## 2021-10-23 PROCEDURE — 6360000002 HC RX W HCPCS: Performed by: INTERNAL MEDICINE

## 2021-10-23 PROCEDURE — 97162 PT EVAL MOD COMPLEX 30 MIN: CPT

## 2021-10-23 PROCEDURE — 83036 HEMOGLOBIN GLYCOSYLATED A1C: CPT

## 2021-10-23 PROCEDURE — 99232 SBSQ HOSP IP/OBS MODERATE 35: CPT | Performed by: INTERNAL MEDICINE

## 2021-10-23 PROCEDURE — 1200000000 HC SEMI PRIVATE

## 2021-10-23 PROCEDURE — 85025 COMPLETE CBC W/AUTO DIFF WBC: CPT

## 2021-10-23 RX ORDER — INSULIN LISPRO 100 [IU]/ML
0-6 INJECTION, SOLUTION INTRAVENOUS; SUBCUTANEOUS NIGHTLY
Status: DISCONTINUED | OUTPATIENT
Start: 2021-10-23 | End: 2021-10-24 | Stop reason: HOSPADM

## 2021-10-23 RX ORDER — HEPARIN SODIUM 5000 [USP'U]/ML
5000 INJECTION, SOLUTION INTRAVENOUS; SUBCUTANEOUS EVERY 8 HOURS SCHEDULED
Status: DISCONTINUED | OUTPATIENT
Start: 2021-10-23 | End: 2021-10-24 | Stop reason: HOSPADM

## 2021-10-23 RX ORDER — LISINOPRIL 10 MG/1
10 TABLET ORAL DAILY
Status: DISCONTINUED | OUTPATIENT
Start: 2021-10-23 | End: 2021-10-24 | Stop reason: HOSPADM

## 2021-10-23 RX ORDER — LISINOPRIL 40 MG/1
20 TABLET ORAL DAILY
Status: ON HOLD | COMMUNITY
End: 2021-10-24 | Stop reason: HOSPADM

## 2021-10-23 RX ORDER — FUROSEMIDE 80 MG
80 TABLET ORAL DAILY
COMMUNITY

## 2021-10-23 RX ORDER — METOPROLOL SUCCINATE 25 MG/1
25 TABLET, EXTENDED RELEASE ORAL DAILY
Status: DISCONTINUED | OUTPATIENT
Start: 2021-10-23 | End: 2021-10-23

## 2021-10-23 RX ORDER — METOPROLOL SUCCINATE 25 MG/1
25 TABLET, EXTENDED RELEASE ORAL ONCE
Status: COMPLETED | OUTPATIENT
Start: 2021-10-23 | End: 2021-10-23

## 2021-10-23 RX ORDER — METOPROLOL SUCCINATE 50 MG/1
50 TABLET, EXTENDED RELEASE ORAL DAILY
Status: DISCONTINUED | OUTPATIENT
Start: 2021-10-24 | End: 2021-10-24 | Stop reason: HOSPADM

## 2021-10-23 RX ORDER — INSULIN LISPRO 100 [IU]/ML
0-12 INJECTION, SOLUTION INTRAVENOUS; SUBCUTANEOUS
Status: DISCONTINUED | OUTPATIENT
Start: 2021-10-23 | End: 2021-10-24 | Stop reason: HOSPADM

## 2021-10-23 RX ORDER — FUROSEMIDE 80 MG
80 TABLET ORAL DAILY
Status: DISCONTINUED | OUTPATIENT
Start: 2021-10-23 | End: 2021-10-24 | Stop reason: HOSPADM

## 2021-10-23 RX ORDER — POTASSIUM CHLORIDE 20 MEQ/1
20 TABLET, EXTENDED RELEASE ORAL 2 TIMES DAILY
COMMUNITY

## 2021-10-23 RX ORDER — METOPROLOL SUCCINATE 25 MG/1
25 TABLET, EXTENDED RELEASE ORAL DAILY
Status: ON HOLD | COMMUNITY
End: 2021-10-24 | Stop reason: HOSPADM

## 2021-10-23 RX ORDER — CALCIUM CARBONATE 200(500)MG
500 TABLET,CHEWABLE ORAL 3 TIMES DAILY PRN
Status: DISCONTINUED | OUTPATIENT
Start: 2021-10-23 | End: 2021-10-24 | Stop reason: HOSPADM

## 2021-10-23 RX ADMIN — INSULIN LISPRO 1 UNITS: 100 INJECTION, SOLUTION INTRAVENOUS; SUBCUTANEOUS at 21:25

## 2021-10-23 RX ADMIN — ANTACID TABLETS 500 MG: 500 TABLET, CHEWABLE ORAL at 21:58

## 2021-10-23 RX ADMIN — INSULIN LISPRO 3 UNITS: 100 INJECTION, SOLUTION INTRAVENOUS; SUBCUTANEOUS at 08:33

## 2021-10-23 RX ADMIN — FUROSEMIDE 80 MG: 80 TABLET ORAL at 14:54

## 2021-10-23 RX ADMIN — INSULIN LISPRO 4 UNITS: 100 INJECTION, SOLUTION INTRAVENOUS; SUBCUTANEOUS at 17:43

## 2021-10-23 RX ADMIN — METOPROLOL SUCCINATE 25 MG: 25 TABLET, EXTENDED RELEASE ORAL at 12:51

## 2021-10-23 RX ADMIN — HEPARIN SODIUM 5000 UNITS: 5000 INJECTION INTRAVENOUS; SUBCUTANEOUS at 21:24

## 2021-10-23 RX ADMIN — METOPROLOL SUCCINATE 25 MG: 25 TABLET, EXTENDED RELEASE ORAL at 10:48

## 2021-10-23 RX ADMIN — ASPIRIN 81 MG: 81 TABLET, COATED ORAL at 06:15

## 2021-10-23 RX ADMIN — ATORVASTATIN CALCIUM 80 MG: 80 TABLET, FILM COATED ORAL at 21:24

## 2021-10-23 RX ADMIN — INSULIN LISPRO 6 UNITS: 100 INJECTION, SOLUTION INTRAVENOUS; SUBCUTANEOUS at 12:52

## 2021-10-23 RX ADMIN — LISINOPRIL 10 MG: 10 TABLET ORAL at 10:48

## 2021-10-23 ASSESSMENT — ENCOUNTER SYMPTOMS
APNEA: 0
CHOKING: 0
FACIAL SWELLING: 0
ROS SKIN COMMENTS: LEFT FOOT
SHORTNESS OF BREATH: 0
CHEST TIGHTNESS: 0
COUGH: 0
PHOTOPHOBIA: 0

## 2021-10-23 ASSESSMENT — PAIN SCALES - GENERAL
PAINLEVEL_OUTOF10: 0

## 2021-10-23 NOTE — PROGRESS NOTES
Clinical Pharmacy Progress Note  Medication History     Admit Date: 10/22/2021    List of of current medications patient is taking is complete. Home Medication list in EPIC updated to reflect changes noted below. Source of information: Patient conversation, [facility] Pharmacist    Patient's home pharmacy: [facility] ()    There were no medications in this patients list prior to now, medication list is now up to date. Please call with any questions.   Carola CoelloD  PGY-1 Pharmacy Resident  [id number]  10/23/2021 1:59 PM

## 2021-10-23 NOTE — PROGRESS NOTES
ICU Progress Note    Admit Date: 10/22/2021  Day: 2  Vent Day: None  IV Access:Peripheral  IV Fluids:None  Vasopressors:None                Antibiotics: None  Diet: ADULT DIET; Regular; 3 carb choices (45 gm/meal); Low Sodium (2 gm)    CC: Right upper extremity weakness    Interval history: No acute overnight events. Patient reported increased muscle strength in the RUE. He is still not able to move his right hand or fingers. MRI of the brain showed acute cortical infarction 4x2 cm involving precentral and postcentral gyrus and containing small amounts of hemorrhagic staining. HPI:  60year old male patient with PMH CHF, DM2 presenting with RUE weakness. He was driving home from work after stopping at [employer], and upon stepping out of his car noticed that he couldn't raise his right arm and also felt numbness/tingling. He had no other neurological deficits at that time. This was around 10:40 p.m. He called his sister who was a physician who advised him to go to the hospital. He drove himself using his left hand. His initial NIHSS 2-3. After CT, patient was unable to transfer himself to [facility] because his right leg became weak as well. Patient was within TPA window and with discussion patient was agreeable to TPA administered at 2:10 a.m. He was subsequently transferred to ICU at [facility]. Endorses nausea, denies fever, chills, lightheadedness, chest pain, shortness of breath, abdominal pain, constipation, diarrhea. Denies smoking, drinking, drug use. Works as a  and lives alone. Father recently passed. Patient has a wound on sole of left foot from blister that ruptured after walking on hot pavement. Patient is vaccinated with Kamran Dougherty x2. He states that he takes furosemide, lisinopril, and metoprolol.     In ICU, patient was hypertensive to 156/126. He had troponin elevation of 0.08 and BNP 2399. Glu 207. Electrolytes wnl. Leukocytosis of 13.2. INR 1.37.  CXR significant for multifocal patchy opacities; pulmonary edema versus multifocal pneumonia. Medications:     Scheduled Meds:   insulin lispro  0-12 Units SubCUTAneous TID     insulin lispro  0-6 Units SubCUTAneous Nightly    metoprolol succinate  25 mg Oral Daily    lisinopril  10 mg Oral Daily    aspirin  81 mg Oral Daily    Or    aspirin  300 mg Rectal Daily    atorvastatin  80 mg Oral Nightly    influenza virus vaccine  0.5 mL IntraMUSCular Prior to discharge    scopolamine  1 patch TransDERmal Q72H     Continuous Infusions:   dextrose       PRN Meds:calcium carbonate, ondansetron **OR** ondansetron, polyethylene glycol, labetalol, glucose, dextrose, glucagon (rDNA), dextrose, prochlorperazine    Objective:   Vitals:   T-max:  Patient Vitals for the past 8 hrs:   BP Temp Temp src Pulse Resp SpO2   10/23/21 0900    94 21 93 %   10/23/21 0800 (!) 151/98 97.2 °F (36.2 °C) Oral 102 28 92 %   10/23/21 0700 (!) 132/90   87 26 97 %   10/23/21 0600 (!) 144/98   89 27 91 %   10/23/21 0500 (!) 133/94   92 29 (!) 89 %   10/23/21 0400 132/89 97 °F (36.1 °C) Oral 92 30 92 %   10/23/21 0300 (!) 144/98     93 %       Intake/Output Summary (Last 24 hours) at 10/23/2021 1025  Last data filed at 10/22/2021 1445  Gross per 24 hour   Intake 530 ml   Output 1250 ml   Net -720 ml       Review of Systems   Constitutional: Positive for activity change. Negative for appetite change, fatigue and fever. HENT: Negative for facial swelling and hearing loss. Eyes: Negative for photophobia and visual disturbance. Respiratory: Negative for apnea, cough, choking, chest tightness and shortness of breath. Cardiovascular: Positive for leg swelling. Negative for chest pain. Musculoskeletal: Negative for neck pain and neck stiffness. Skin: Positive for wound. Negative for pallor. Left foot   Neurological: Positive for weakness and numbness. Negative for tremors, facial asymmetry, speech difficulty, light-headedness and headaches.         Tingling sensation in the right arm, though sensations on the rt arm are intact. Able to keep right arm against the gravity but profound weakness of the right hand. Psychiatric/Behavioral: Negative for behavioral problems and confusion. Physical Exam  Constitutional:       Appearance: Normal appearance. He is not ill-appearing. HENT:      Nose: Nose normal. No congestion or rhinorrhea. Mouth/Throat:      Pharynx: No oropharyngeal exudate or posterior oropharyngeal erythema. Eyes:      Extraocular Movements: Extraocular movements intact. Conjunctiva/sclera: Conjunctivae normal.      Pupils: Pupils are equal, round, and reactive to light. Cardiovascular:      Rate and Rhythm: Normal rate and regular rhythm. Pulses: Normal pulses. Heart sounds: Normal heart sounds. Pulmonary:      Effort: Pulmonary effort is normal.   Musculoskeletal:         General: Signs of injury present. Right lower leg: Edema present. Left lower leg: Edema present. Comments: At the base of the foot, on the head of the left metatarsal.   Skin:     General: Skin is warm. Neurological:      Mental Status: He is alert and oriented to person, place, and time. Psychiatric:         Mood and Affect: Mood normal.         Behavior: Behavior normal.         Thought Content:  Thought content normal.         Judgment: Judgment normal.                   LABS:    CBC:   Recent Labs     10/22/21  0100 10/22/21  0535 10/23/21  0455   WBC 13.2* 11.1* 13.7*   HGB 14.7 13.6 14.0   HCT 44.6 41.9 42.4    163 159   MCV 89.1 89.9 89.8     Renal:    Recent Labs     10/22/21  0100 10/22/21  0535 10/23/21  0455    136 135*   K 3.6 3.4* 3.8   CL 99 98* 98*   CO2 28 26 27   BUN 18 16 14   CREATININE 1.1 1.0 0.8*   GLUCOSE 218* 215* 230*   CALCIUM 9.3 8.5 8.1*   MG  --  1.60* 2.00   ANIONGAP 11 12 10     Hepatic:   Recent Labs     10/22/21  0100   AST 18   ALT 28   BILITOT 2.2*   PROT 7.4   LABALBU 4.1   ALKPHOS 106 Troponin:   Recent Labs     10/22/21  0535 10/22/21  1010 10/22/21  1752   TROPONINI 0.07* 0.04* <0.01     BNP: No results for input(s): BNP in the last 72 hours. Lipids: No results for input(s): CHOL, HDL in the last 72 hours. Invalid input(s): LDLCALCU, TRIGLYCERIDE  ABGs:  No results for input(s): PHART, BPO5FGX, PO2ART, XAD9PAH, BEART, THGBART, G9LXINMK, TCU7WEJ in the last 72 hours. INR:   Recent Labs     10/22/21  0100   INR 1.37*     Lactate: No results for input(s): LACTATE in the last 72 hours. Cultures:  -----------------------------------------------------------------  RAD:   MRI BRAIN WO CONTRAST   Final Result      Acute cortical infarction 4 x 2 cm involving precentral gyrus and postcentral gyrus and containing small amount of hemorrhagic staining      Critical results reported to ICU nurse Lima Kyle at 08:10 on 10/23/2021. XR FOOT LEFT (MIN 3 VIEWS)   Final Result   1. Soft tissue defect in the plantar forefoot. 2.  No radiographic evidence of osteomyelitis. CT head without contrast    (Results Pending)         Assessment/Plan:   The patient  is a 60years old male who presented with right upper extremity weakness that progressed to right lower extremity weakness he was transferred to the ICU following administration of TPA around 2:10 AM    TIA versus CVA  -Right upper extremity weakness, that progressed to rt lower extremity weakness. -CT scanning of the head showed no acute intracranial hemorrhage, mass-effect, midline shift or hydrocephalus with no loss of gray-white junction to indicate site of territorial infarct. -CT of the head and neck showed no definite large vessel occlusion.   Lungs demonstrated groundglass opacities with smooth interlobular septal thickening which may relate to pulmonary edema.  -Last known normal was around 10:40 PM, patient received TPA at 2:10 AM  -Plan to keep blood pressure under 185/105  -Aspirin following repeat CT scan 24 hours after administration of TPA  -Follow-up with lipid panel, follow-up with HbA1c  - neurology following  - Atorvastatin 80 mg  - F/U brain MRI - showed acute cortical infarction 4x2 cm involving precentral and postcentral gyrus and containing small amounts of hemorrhagic staining.   - 10/23 - Patient reported increased muscle strength in the rt arm as compared to yesterday and was able to hold arm steady against the gravity. He endorses persistent weakness in the rt hand and rt fingers. On q4 Hour neuro checks and can be transferred to the floor. Will start on atorvastatin and Aspirin but given hemorrhage within the stroke, as per neuro would want to hold off on anticoagulation. Mild CHF exacerbation  -Patient with bilateral pitting edema on exam  -Troponin 0 0.08  -proBNP 2399  -Evidence of pulmonary edema on chest x-ray  -However patient denies any chest pain or shortness of breath  -Plan  -The patient received 1 dose of 20 mg of Lasix  - Will avoid strict lowering of the blood pressure.   -We will be following up on EKG and trending troponins, BMP  - F/U echo showed mitral valve calcification with severe dilatation of the left atrium. No rt to left shunt. Rt atrium severely dilated. EF: 10-15% with global hypokinesis  -10/23 - inpatient consult to cardiology. F/U on recs. - Toprol XL 25 mg. Type 2 diabetes mellitus  -Patient takes Metformin at home  -POCT q4h  with medium dose sliding scale insulin  - Hypoglycemia protocol. Full thickness ulceration of the left foot:  - patient has hx of T2DM. - Podiatry consulted - no signs of osteomyelitis  - No wound culture obtained because of no sign of acute infection, leukocytosis seems to be trending down  - Patient will F/U with podiatry one week following discharge. - COVID- 19 PCR negative. Will take the patient off COVID precautions. - Scopolamine patch.      Code Status: Full  FEN: Adult diet  PPX: Aspirin  DISPO: ICU    Bernarda Barrios MD, PGY-1  10/23/21  10:25 AM    This patient will be staffed and discussed with Dr. Stacy Serrano    THE [facility] [place]     Patient seen and examined. I agree with Dr. Maicol Ball history, physical, lab findings, assessment and plan.     Patient presented to [address] [place] [address] of right arm weakness with last known well at 10:40 PM.  While obtaining CT head there he developed worsening right leg and arm weakness. CT/CTA without hemorrhage or acute ischemic change.  CTA without LVO. NIH score was 8 and patient was within window for TPA and had no contraindications. TPA was given at 2:10 AM and since then he has had some improvement in his right upper extremity weakness but still quite weak in his right hand. MRI Brain  Impression       Acute cortical infarction 4 x 2 cm involving precentral gyrus and postcentral gyrus and containing small amount of hemorrhagic staining     ECHO shows EF 10%. EP now seeing. Resume home lasix at 80 and Toprol XL at 50.  Will need AICD     Continue Lipitor 80 mg daily  SBP at goal of less than 180  Sars COV 2 PCR negative  Neurology following  PT/OT following    Ok to TXF to Daniela Schmitt MD

## 2021-10-23 NOTE — PROGRESS NOTES
Occupational Therapy   Occupational Therapy Initial Assessment/Treatment  Date: 10/23/2021   Patient Name: Jerod Brown  MRN: [id number]     : 1965    Date of Service: 10/23/2021    Discharge Recommendations:    Jerod Brown scored a 19/24 on the AM-PAC ADL Inpatient form. Current research shows that an AM-PAC score of 18 or greater is typically associated with a discharge to the patient's home setting. Based on the patient's AM-PAC score, and their current ADL deficits, it is recommended that the patient have 2-3 sessions per week of Occupational Therapy at d/c to increase the patient's independence. At this time, this patient demonstrates the endurance and safety to discharge home with OP services and a follow up treatment frequency of 2-3x/wk. Please see assessment section for further patient specific details. If patient discharges prior to next session this note will serve as a discharge summary. Please see below for the latest assessment towards goals. OT Equipment Recommendations  Equipment Needed: No    Assessment   Performance deficits / Impairments: Decreased functional mobility ; Decreased ADL status; Decreased ROM; Decreased strength;Decreased coordination  Assessment: Pt presents with a decline in functional independence. Pt is from home alone and was independent and working full time. Currently, pt demonstrates decreased Rt UR function. Also, pt is to be partial wt bearing on his left foot secondary to a wound. Pt does well with maintainig Wt bearing status. Feel pt would benefit from further OT services for Rt UE and further instruction on adapted ADL techniques. Treatment Diagnosis: Impaired Rt UE functiona, ADL, and functional mobility  Prognosis: Good  Decision Making: Medium Complexity  OT Education: OT Role;Plan of Care;Precautions; ADL Adaptive Strategies;Transfer Training, Rt UE positioning  Patient Education: Pt demonstrated understanding  REQUIRES OT FOLLOW UP: Yes  Activity Tolerance  Activity Tolerance: Patient Tolerated treatment well  Safety Devices  Safety Devices in place: Yes  Type of devices: Left in chair;Call light within reach; Chair alarm in place;Nurse notified (MD present)       Treatment Diagnosis: Impaired Rt UE functiona, ADL, and functional mobility      Restrictions  Position Activity Restriction  Other position/activity restrictions: PWB heel to L leg, post op shoe, up with assist    Subjective   General  Chart Reviewed: Yes  Additional Pertinent Hx: Pt is 61 yo male admitted [facility] 10/23[facility]21 from [address] after c/o RUE weakness while driving. Later presented with RLE weakness. Was administered tPA. CT/CTA negative. MRI and CTH results pending. PMH includes CHF and DM. Family / Caregiver Present: No  Referring Practitioner: Dr. Eden Jeffrey  Diagnosis: Acute CVA  Subjective  Subjective: Pt in bed upon entry. Pt agreeable to activity.   Patient Currently in Pain: Denies  Social/Functional History  Social/Functional History  Lives With: Alone (2 dogs and 3 cats)  Type of Home: House  Home Layout: Two level, Laundry in basement, Bed/Bath upstairs, 1/2 bath on main level  Home Access: Stairs to enter with rails  Entrance Stairs - Number of Steps: 15  Entrance Stairs - Rails: Left  Bathroom Shower/Tub: Tub/Shower unit  Bathroom Toilet: Standard  Bathroom Equipment:  (None)  Home Equipment:  (None (pt has access to some equipment from his father who recently passed away))  ADL Assistance: [address]: Independent  Homemaking Responsibilities: Yes  Bill Paying/Finance Responsibility: Primary  Health Care Management: Primary  Ambulation Assistance: Independent  Transfer Assistance: Independent  Active : Yes  Mode of Transportation: Car  Education: Associate's degree  Occupation: Full time employment  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 [employer]  Additional Strengthening, ROM, Balance Training, Functional Mobility Training, Endurance Training, Self-Care / ADL    AM-PAC Score        AM-PAC Inpatient Daily Activity Raw Score: 19 (10/23/21 1319)  AM-PAC Inpatient ADL T-Scale Score : 40.22 (10/23/21 1319)  ADL Inpatient CMS 0-100% Score: 42.8 (10/23/21 1319)  ADL Inpatient CMS G-Code Modifier : CK (10/23/21 1319)    Goals                          No goals met  Short term goals  Time Frame for Short term goals: Discharge  [facility]  Short term goal 2: Lower body dressing with supervision  Short term goal 3: Pt will position Rt UE w/o cues  Patient Goals   Patient goals : Go home.      \"Full function of my hand\"       Therapy Time   Individual Concurrent Group Co-treatment   Time In 0858         Time Out 0951         Minutes 53         Timed Code Treatment Minutes: [address], OTR/L [id number]

## 2021-10-23 NOTE — PROGRESS NOTES
Dysarthria   Cardiovascular- No palpitations. No chest pain   Respiratory- + dyspnea on exertion . No Cough   Gastrointestinal- No Abdominal pain. No Vomiting. Genitourinary- No incontinence. No urinary retention   Musculoskeletal- No myalgia. No arthralgia   Skin- No rash. No easy bruising. Psychiatric- No depression. No anxiety   Endocrine- No diabetes. No thyroid issues. Hematologic- No bleeding difficulty. No fatigue   Neurologic- + weakness. No Headache. PHYSICAL EXAM:  BP (!) 151/98   Pulse 94   Temp 97.2 °F (36.2 °C) (Oral)   Resp 21   Ht 6' (1.829 m)   Wt 280 lb 6.8 oz (127.2 kg)   SpO2 93%   BMI 38.03 kg/m²     General Alert, no distress, well-nourished  Cardiovascular: Warm, appears well perfused   Respiratory: Easy, non-labored respiratory pattern   Extremities: Upper and lower extremities are atraumatic in appearance without deformity. No swelling or erythema. Neurologic  Mental status:   orientation to person, place, time, situation              Attention intact as able to attend well to the exam                Language fluent in conversation              Comprehension intact; follows simple commands     Cranial nerves:   CN2: Visual fields full w/o extinction on confrontational testing   CN 3,4,6: Pupils equal and reactive to light, extraocular muscles intact  CN5: Facial sensation symmetric   CN7: Face symmetric bilaterally   CN8: Hearing symmetric to spoken voice  CN9: Palate elevated symmetrically  CN11: Traps full strength on shoulder shrug  CN12: Tongue midline with protrusion     Motor Exam:    R  L    Deltoid 5  5   Biceps 5 5   Triceps 5 5   Wrist extension  0 5   Interossei 0 5        R  L    Hip flexion  5 5   Knee flexion  5 5   Knee extension  5 5   Ankle dorsiflexion  5 5   Ankle plantar flexion  5 5      Deep tendon reflexes:     R  L    Biceps  1 1   Brachioradialis  1 1   Patellar  1 1      Sensory: light touch intact and symmetric in all 4 extremities.   No sensory extinction on bilateral simultaneous stimulation  Tone: normal in all 4 extremities      IMAGES:  Images personally reviewed and agree w/ radiology interpretation. Head CT w/o Contrast:  CT HEAD WO CONTRAST 10/22/2021    Impression  No acute intracranial hemorrhage, mass effect, midline shift, or  hydrocephalus. No loss of the gray-white junction to indicate site of  territorial infarct. CTA is ordered. Critical results were called by Dr. Daryl Fontenot MD to Dr Marlin Orr on  10/22/2021 at 01:49. Reportedly has right-sided body symptoms with  hyperacute worsening. CTA of Head / Neck w/ Contrast:  CTA HEAD NECK W CONTRAST 10/22/2021    Impression  1. No definite large vessel occlusion given suboptimal contrast  opacification of the large arteries in the head and neck. 2.  Visualized lungs demonstrate ground-glass opacities with smooth  interlobular septal thickening which may relate to pulmonary edema. Critical results were called by Dr. Fariba Gonzalez to Dr. Marlin Orr  on 10/22/2021 at 01:58. This scan was analyzed using [employer]. ai contact LVO. Identification of suspected  findings is not for diagnostic use beyond notification. Viz LVO is limited to  analysis of imaging data and should not be used in-lieu of full patient  evaluation or relied upon to make or confirm diagnosis. MRI Brain w/o Contrast:   MRI BRAIN WO CONTRAST 10/23/2021      Impression  Acute cortical infarction 4 x 2 cm involving precentral gyrus and postcentral gyrus and containing small amount of hemorrhagic staining    Critical results reported to ICU nurse Colby Ryan at 08:10 on 10/23/2021. Echocardiogram with bubble:  Left ventricular cavity size is dilated. There is mild concentric left   ventricular hypertrophy. Overall left ventricular systolic function appears severely reduced with an   ejection fraction of 10-15%. Global hypokinesis.    Diastolic filling parameters suggest grade III diastolic dysfunction. Moderate mitral regurgitation is present. Mild tricuspid regurgitation. Estimated pulmonary artery systolic pressure is 37HESR assuming a right   atrial pressure of 3 mmHg. Biatrial enlargement. A bubble study was performed and fails to show evidence of right to left   shunting. There is a pleural effusion. LABS:  All results below personally reviewed. Pertinent positives & negatives are addressed in Impression & Recommendations below.    Recent Labs     10/22/21  0100 10/22/21  0535 10/23/21  0455    136 135*   K 3.6 3.4* 3.8   CL 99 98* 98*   CO2 28 26 27   BUN 18 16 14   CREATININE 1.1 1.0 0.8*   GLUCOSE 218* 215* 230*   CALCIUM 9.3 8.5 8.1*   LABALBU 4.1  --   --    MG  --  1.60* 2.00   ALKPHOS 106  --   --    ALT 28  --   --    AST 18  --   --      Recent Labs     10/22/21  0100 10/22/21  0535 10/23/21  0455   WBC 13.2* 11.1* 13.7*   RBC 5.00 4.66 4.72   HGB 14.7 13.6 14.0   HCT 44.6 41.9 42.4    163 159   INR 1.37*  --   --      Lab Results   Component Value Date    INR 1.37 10/22/2021    COVID19 Not Detected 10/22/2021     No results found for: PHENYTOIN, KEPPRA, LACOSA, LAMO, VALPROATE, LACTSEPSIS, LACTA    LIPID PANEL: IN PROCESS  HEMOGLOBIN A1C: IN PROCESS       CURRENT SCHEDULED MEDICATIONS:   insulin lispro  0-12 Units SubCUTAneous TID     insulin lispro  0-6 Units SubCUTAneous Nightly    aspirin  81 mg Oral Daily    Or    aspirin  300 mg Rectal Daily    atorvastatin  80 mg Oral Nightly    influenza virus vaccine  0.5 mL IntraMUSCular Prior to discharge    scopolamine  1 patch TransDERmal Q72H     dextrose      calcium carbonate, 500 mg, TID PRN  ondansetron, 4 mg, Q8H PRN   Or  ondansetron, 4 mg, Q6H PRN  polyethylene glycol, 17 g, Daily PRN  labetalol, 10 mg, Q10 Min PRN  glucose, 15 g, PRN  dextrose, 12.5 g, PRN  glucagon (rDNA), 1 mg, PRN  dextrose, 100 mL/hr, PRN  prochlorperazine, 5 mg, Q6H PRN         IMPRESSION & RECOMMENDATIONS IMPRESSION:60year old man with PMH CHF, DM who presented with acute right hand weakness consistent with stroke. tPA was administered his symptoms did not improve, unfortunately. RECOMMENDATIONS:    Acute Ischemic Stroke  -ASA 81mg PO daily   -PT/OT/ST, PMR If rehab appropriate  -Q4H neuro checks and vital signs, ok to move to floor      Heart Failure  -Cardiology consult  -Suspect this is the cause of his stroke  -Given hemorrhagic conversion on MRI (no present on CT) and size of infarct, would need to wait 10 days before starting anti-coagulation if cardiology feels appropriate    Hypertension:  Goal  reduce BP 10/5 from baseline for all patients. History of diabetes or renal disease: goal BP < 130/80. Dyslipidemia:   Atherosclerotic Stroke or TIA: LDL goal < 70mg/dl or 50% reduction in LDL from baseline. Symptomatic atherosclerotic cardiovascular disease and ? 74yo: high-intensity statin  Medication: 80mg atorvastatin PO nightly     Diabetes Mellitus:   Goal  HbA1c < 7%. Current HbA1C: in process  Medication: Continue home meds        Clinic follow-up:  Will need to follow up with Roland Rosenbaum in 1 month        [address] [place] [address]   Neurology & Neurocritical Care   Neurology Line: [phone]  [employer]: [facility] NPs  10/23/2021 9:08 AM

## 2021-10-23 NOTE — CONSULTS
[address]   Cardiac Electrophysiology Consultation   Date: 10/23/2021  Admit Date:  10/22/2021  Reason for Consultation: Congestive heart failure  Consult Requesting Physician: Krista Hernadez MD     No chief complaint on file. HPI: Percy Mac is a 60 y.o. gentleman with a past medical history significant for chronic systolic congestive heart failure, hypertension, type 2 diabetes mellitus was admitted to the hospital secondary to concerns of stroke. In 2015, he was diagnosed to have systolic congestive heart failure with LV ejection fraction of 20%. At the time, he was admitted at [facility] in [place]. He also had a coronary angiogram and said to be within normal range. Since then, he was on medical therapy including metoprolol, lisinopril and the diuresis. Unfortunately, he did not follow-up with cardiology. Few years ago, he was also seen by Dr. Marcellus Kessler at [facility] and eventually was follow-up also. He works as a . He does not have any insurance and hence he is not following with physicians. He endorses exertional difficulty in breathing over a period of time. He does have a blister in his left sole which is being treated with wound care nurse. There is no need for antibiotics. Past Medical History:   Diagnosis Date    [facility]congestive heart failure) ([facility]     Diabetes mellitus ([facility]         No past surgical history on file. No Known Allergies    Social History:  Reviewed. reports previous alcohol use. He reports that he does not use drugs. Family History:  Reviewed. family history is not on file. No premature CAD. Review of System:  All other systems reviewed except for that noted above.  Pertinent negatives and positives are:     Objective      · General: negative for fever, chills   · Ophthalmic ROS: negative for - eye pain or loss of vision  · ENT ROS: negative for - headaches, sore throat   · Respiratory: negative for - cough, lesions, ulcerations noted. · Psychiatric: No anxiety nor agitation. Labs:  Reviewed. Recent Labs     10/22/21  0100 10/22/21  0535 10/23/21  0455    136 135*   K 3.6 3.4* 3.8   CL 99 98* 98*   CO2 28 26 27   BUN 18 16 14   CREATININE 1.1 1.0 0.8*     Recent Labs     10/22/21  0100 10/22/21  0535 10/23/21  0455   WBC 13.2* 11.1* 13.7*   HGB 14.7 13.6 14.0   HCT 44.6 41.9 42.4   MCV 89.1 89.9 89.8    163 159     Lab Results   Component Value Date    TROPONINI <0.01 10/22/2021     No results found for: BNP  Lab Results   Component Value Date    PROTIME 15.7 10/22/2021    INR 1.37 10/22/2021     No results found for: CHOL, HDL, TRIG    Diagnostic and imaging results reviewed. ECG: Sinus rhythm, narrow QRS complex  Echo: LV ejection fraction 10 to 15%, severe biatrial enlargement  Cath: No obstructive coronary disease in 2015    I independently reviewed the ECG and telemetry.      Scheduled Meds:   insulin lispro  0-12 Units SubCUTAneous TID WC    insulin lispro  0-6 Units SubCUTAneous Nightly    metoprolol succinate  25 mg Oral Daily    lisinopril  10 mg Oral Daily    aspirin  81 mg Oral Daily    Or    aspirin  300 mg Rectal Daily    atorvastatin  80 mg Oral Nightly    influenza virus vaccine  0.5 mL IntraMUSCular Prior to discharge    scopolamine  1 patch TransDERmal Q72H     Continuous Infusions:   dextrose       PRN Meds:.calcium carbonate, ondansetron **OR** ondansetron, polyethylene glycol, labetalol, glucose, dextrose, glucagon (rDNA), dextrose, prochlorperazine     Assessment:   Patient Active Problem List    Diagnosis Date Noted    Cerebrovascular accident (CVA) due to occlusion of left middle cerebral artery ([facility] 10/22/2021    Diabetes mellitus ([facility]     CHF (congestive heart failure) ([facility]     BMI 38.0-38.9,adult       Active Hospital Problems    Diagnosis Date Noted    Cerebrovascular accident (CVA) due to occlusion of left middle cerebral artery ([facility] [J76.386] 10/22/2021    [facility] [E11.9]     CHF (congestive heart failure) ([facility]) [I50.9]     BMI 38.0-38.9,adult [Z68.38]      Recommendation (s):  1. Acute on chronic systolic congestive heart failure  2. Poorly controlled hypertension  3. Poorly controlled diabetes mellitus  4. Acute stroke    Patient has a long history of chronic congestive heart failure and unfortunately did not follow with cardiology secondary to financial constraints. This time, he is admitted with acute embolic stroke. His echocardiogram showed LV ejection fraction of 10 to 15%. There is significant left atrial enlargement noted on echocardiogram.  His EKG also showed left atrial mobility. However, there is no documented evidence of atrial fibrillation so far. Similarly, there is no LV thrombus noted. He definitely needs to have long-term monitoring to evaluate for any atrial arrhythmias which could relate to his stroke. As far as the etiology of his congestive heart failure, I do not find any reversible cause as of now. His coronary angiogram was said to be within normal limits in 2015. At the time, he states that he was not consuming alcohol very very rarely. Nowadays, he is not consuming alcohol. He denies any recreational drug use. Overall, I cannot pinpoint the reasoning behind his nonischemic cardiomyopathy. He presented with acute on chronic systolic congestive heart failure. His LV ejection fraction is less than 30% for more than 6 years. Initially, he was on optimal medical therapy with lisinopril, Toprol and Lasix. For the past few weeks to few days, he is running out of the medications. However, in spite of being on optimal medical therapy, there is no change in his LV ejection fraction. He does not have any acute infection. Hence, I recommended ICD implantation. Secondary to concerns of atrial arrhythmia, it is reasonable to consider dual-chamber ICD implantation.   Secondary to his lack of insurance, we will have to plan for the timing of ICD implantation. As his QRS duration is narrow, he would not qualify for CRT-D. We discussed about the indications, risk and benefits of ICD implantation. Risk of the procedure include pneumothorax and pericardial effusion. I restarted him on metoprolol and lisinopril to control his blood pressure. We will follow    Thank you for allowing me to participate in the care of Anel Ibanez . If you have any questions/comments, please do not hesitate to contact us. Guadalupe Cifuentes MD   Cardiac Electrophysiology  [address] [place] [address]    For any EP related issues after 5 PM, contact [facility] on call cardiology through .

## 2021-10-23 NOTE — PROGRESS NOTES
Hospitalist Progress Note      PCP: No primary care provider on file. Date of Admission: 10/22/2021    Chief Complaint: Right upper extremity weakness    Hospital Course: Admitted for acute ischemic stroke. Status post TPA. Echo with an EF of 10 to 15%. Cardiology consulted    Subjective: Patient still with right upper extremity weakness. Denies any other complaints. Does mention that he has been taking lisinopril, Lasix and metoprolol for heart failure. Discussed with patient regarding cardiology consult. Medications:  Reviewed    Infusion Medications    dextrose       Scheduled Medications    insulin lispro  0-12 Units SubCUTAneous TID WC    insulin lispro  0-6 Units SubCUTAneous Nightly    aspirin  81 mg Oral Daily    Or    aspirin  300 mg Rectal Daily    atorvastatin  80 mg Oral Nightly    influenza virus vaccine  0.5 mL IntraMUSCular Prior to discharge    scopolamine  1 patch TransDERmal Q72H     PRN Meds: calcium carbonate, ondansetron **OR** ondansetron, polyethylene glycol, labetalol, glucose, dextrose, glucagon (rDNA), dextrose, prochlorperazine      Intake/Output Summary (Last 24 hours) at 10/23/2021 0854  Last data filed at 10/22/2021 1445  Gross per 24 hour   Intake 530 ml   Output 1250 ml   Net -720 ml       Physical Exam Performed:    BP (!) 144/98   Pulse 89   Temp 97 °F (36.1 °C) (Oral)   Resp 27   Ht 6' (1.829 m)   Wt 280 lb 6.8 oz (127.2 kg)   SpO2 91%   BMI 38.03 kg/m²     General appearance: No apparent distress, appears stated age and cooperative. HEENT: Pupils equal, round, and reactive to light. Conjunctivae/corneas clear. Neck: Supple, with full range of motion. No jugular venous distention. Trachea midline. Respiratory:  Normal respiratory effort. Clear to auscultation, bilaterally without Rales/Wheezes/Rhonchi. Cardiovascular: Regular rate and rhythm with normal S1/S2 without murmurs, rubs or gallops.   Abdomen: Soft, non-tender, non-distended with normal bowel sounds. Musculoskeletal: Left foot and boot. Pictures from podiatry note reviewed  Skin: Skin color, texture, turgor normal.  No rashes or lesions. Neurologic: Cranial nerves intact. Right upper extremity weakness improving, has right hand wrist drop  Psychiatric: Alert and oriented, thought content appropriate, normal insight  Capillary Refill: Brisk,< 3 seconds   Peripheral Pulses: +2 palpable, equal bilaterally       Labs:   Recent Labs     10/22/21  0100 10/22/21  0535 10/23/21  0455   WBC 13.2* 11.1* 13.7*   HGB 14.7 13.6 14.0   HCT 44.6 41.9 42.4    163 159     Recent Labs     10/22/21  0100 10/22/21  0535 10/23/21  0455    136 135*   K 3.6 3.4* 3.8   CL 99 98* 98*   CO2 28 26 27   BUN 18 16 14   CREATININE 1.1 1.0 0.8*   CALCIUM 9.3 8.5 8.1*     Recent Labs     10/22/21  0100   AST 18   ALT 28   BILITOT 2.2*   ALKPHOS 106     Recent Labs     10/22/21  0100   INR 1.37*     Recent Labs     10/22/21  0535 10/22/21  1010 10/22/21  1752   TROPONINI 0.07* 0.04* <0.01       Urinalysis:    No results found for: Dorethia Ortiz, BACTERIA, RBCUA, BLOODU, SPECGRAV, GLUCOSEU    Radiology:  MRI BRAIN WO CONTRAST   Final Result      Acute cortical infarction 4 x 2 cm involving precentral gyrus and postcentral gyrus and containing small amount of hemorrhagic staining      Critical results reported to ICU nurse Saqib Soliz at 08:10 on 10/23/2021. XR FOOT LEFT (MIN 3 VIEWS)   Final Result   1. Soft tissue defect in the plantar forefoot. 2.  No radiographic evidence of osteomyelitis.       CT head without contrast    (Results Pending)           Assessment/Plan:  Acute ischemic stroke:  Status post TPA  Globin A1c and lipid panel pending  MRI showed acute cortical infarction 4 into 2 cm involving precentral gyrus and post central gyrus and continued small amount of hemorrhage extending  Continue aspirin and statin  Continue neurochecks  Continue PT OT    Acute on chronic systolic heart failure:  Patient had elevated proBNP with evidence of pulmonary edema on chest x-ray  Received IV Lasix  Echo showed an EF of 10 to 15%  Continue Lasix  Cardiology consulted  Patient started on lisinopril and metoprolol  Cardiology recommended ICD implantation. Patient would like to think about it    Hypertension:  Monitor blood pressure  On Lasix, lisinopril and metoprolol    Type 2 diabetes mellitus:  Holding home medication  Hemo-globin A1c pending  Monitor blood glucose  Continue carb controlled diet with sliding scale insulin  Will adjust insulin as needed    Left foot diabetic ulceration:  Continue wound care per podiatry recommendation      DVT Prophylaxis: SQH  Diet: ADULT DIET; Regular; 3 carb choices (45 gm/meal);  Low Sodium (2 gm)  Code Status: Full Code    PT/OT Eval Status: Evaluating    Dispo -continue inpatient care    Casimiro Cerda MD

## 2021-10-23 NOTE — PROGRESS NOTES
Clinical Pharmacy Progress Note    All IV in NS - Management by Pharmacy    Consult Date(s): 10/22/21  Consulting Provider(s): Timothy    Assessment / Plan    Stroke  All IVs in Normal Saline   Drips will be adjusted to normal saline as appropriate based on compatibility, in an effort to avoid fluid shifts, as D5W is osmotically active.  The following intermittent IV drips / infusions have been adjusted to saline:  o None at this time   The following medications must remain in D5W due to incompatibility with normal saline:  o Amiodarone Infusion  o Amphotericin  o Mycophenolate  o Nitroprusside  o Penicillin G Potassium   Note: Patient has D5W ordered as part of hypoglycemia orderset.  Total IV fluid delivered to patient over last 24 hrs: Pushes + flushes only   RPh will follow daily to ensure all IVPBs / drips are in NS where possible. Thank you for consulting Pharmacy!     Jered Alcala PharmD  PGY-1 Pharmacy Resident  [id number]  10/23/2021 7:44 AM

## 2021-10-23 NOTE — PROGRESS NOTES
Neuro status improved with increasing strength in RUE. Still unable to  with right hand. VSS.  Pt to MRI and CT with this RN

## 2021-10-23 NOTE — PROGRESS NOTES
Physical Therapy    Facility/Department: [facility] ICU  Initial Assessment/Treatment    NAME: Rudy Cabrera  : 1965  MRN: [id number]    Date of Service: 10/23/2021    Discharge Recommendations: Rudy Cabrera scored a 18/24 on the AM-PAC short mobility form. Current research shows that an AM-PAC score of 18 or greater is typically associated with a discharge to the patient's home setting. If patient discharges prior to next session this note will serve as a discharge summary. Please see below for the latest assessment towards goals. PT Equipment Recommendations  Equipment Needed: Yes  Mobility Devices: Cane  Cane: Straight Cane    Assessment   Body structures, Functions, Activity limitations: Decreased functional mobility   Assessment: 59 yo admitted following acute CVA & RUE weakness. Rec'd TPA. Pt demo steady gait today & likely could have walked a long distance in [place] if not for WB restriction. Pt did a good job of keeping weight on L heel only. Will likely need a cane for discharge to help keep some weight off LLE- cannot use a walker due to no grasp on RUE. Do not anticipate the need for further PT upon discharge since LEs do not appear affected by the CVA and his balance is good. Treatment Diagnosis: impaired mobility  Prognosis: Good  Decision Making: Medium Complexity  PT Education: Goals;PT Role;Plan of Care;Weight-bearing Education;Transfer Training;Gait Training  Patient Education: use of cane, post-op shoe, PWB to L heel only- verbalized understanding  REQUIRES PT FOLLOW UP: Yes  Activity Tolerance  Activity Tolerance: Patient Tolerated treatment well       Patient Diagnosis(es): There were no encounter diagnoses. has a past medical history of CHF (congestive heart failure) ([facility] [address] and Diabetes mellitus ([facility] [address] has no past surgical history on file.     Restrictions  Position Activity Restriction  Other position/activity restrictions: PWB heel to L leg, post op shoe, up with assist  Vision/Hearing  Vision: Impaired  Vision Exceptions: Wears glasses at all times  Hearing: Within functional limits     Subjective  General  Additional Pertinent Hx: Adm 10/22 with RUE weakness & paresthesia. TPA administered. Head CT (-). CTA (-). Podiatry consult due to sore L foot (healing blister from approx 4 weeks ago when walking on hot pavement in [place]). EF 10-15%. Family / Caregiver Present: No  Diagnosis: acute CVA  Follows Commands: Within Functional Limits  Subjective  Subjective: Found in bed, agreeable to PT. Reports overwhelmed by stroke. States can't get hold of family because cell phone is dead. Recently lost his dad & tearful towards end of session over that. \"I don't see why they are making a big deal about my foot. \"  \"Its been healing nicely. \"  \"I've been up on it already. \"  Explained reasoning & podiatrist's orders & pt appears agreeable to following.   Pain Screening  Patient Currently in Pain: Denies  Vital Signs  Patient Currently in Pain: Denies       Orientation  Orientation  Overall Orientation Status: Within Normal Limits  Social/Functional History  Social/Functional History  Lives With: Alone (2 dogs and 3 cats)  Type of Home: House  Home Layout: Two level, Laundry in basement, Bed/Bath upstairs, 1/2 bath on main level  Home Access: Stairs to enter with rails  Entrance Stairs - Number of Steps: 15  Entrance Stairs - Rails: Left  Bathroom Shower/Tub: Tub/Shower unit  Bathroom Toilet: Standard  Bathroom Equipment:  (None)  Home Equipment:  (None (pt has access to some equipment from his father who recently passed away))  ADL Assistance: Independent  Homemaking Assistance: Independent  Homemaking Responsibilities: Yes  Bill Paying/Finance Responsibility: Primary  Health Care Management: Primary  Ambulation Assistance: Independent  Transfer Assistance: Independent  Active : Yes  Mode of Transportation: Car  Education: Associate's degree  Occupation: Full time employment  Type of occupation: sarah Ray:  - [employer]  Additional Comments: No falls  Cognition        Objective          AROM RLE (degrees)  RLE AROM: WNL  AROM LLE (degrees)  LLE AROM : WNL  Strength RLE  Strength RLE: WNL  Strength LLE  Strength LLE: WNL        Bed mobility  Supine to Sit: Supervision (HOB elev 30)  Transfers  Sit to Stand: Stand by assistance  Stand to sit: Stand by assistance  Ambulation  Ambulation?: Yes  Ambulation 1  Surface: level tile  Device: Small [employer]on  Assistance: Stand by assistance  Quality of Gait: steady gait, able to maintain PWB to L heel  Distance: 15', 30'  Comments: Limited gait in room today due to post-op shoe still not here. Had RN call for it & it arrived after session- did place on L foot. Balance  Sitting - Static: Good  Sitting - Dynamic: Good  Standing - Static: Good  Standing - Dynamic: Good    Treatment included gait/transfer training, pt education. Plan   Plan  Times per week: 5-7  Current Treatment Recommendations: Balance Training, Functional Mobility Training, Transfer Training, Gait Training, Stair training, Safety Education & Training, Patient/Caregiver Education & Training  Safety Devices  Type of devices: Call light within reach, Chair alarm in place, Nurse notified, Left in chair                                                      AM-PAC Score  AM-PAC Inpatient Mobility Raw Score : 18 (10/23/21 1012)  AM-PAC Inpatient T-Scale Score : 43.63 (10/23/21 1012)  Mobility Inpatient CMS 0-100% Score: 46.58 (10/23/21 1012)  Mobility Inpatient CMS G-Code Modifier : CK (10/23/21 1012)          Goals  Short term goals  Time Frame for Short term goals: dc  Short term goal 1: Ambulate >100' S w/ cane, PWB to L heel only with post-op shoe in place. Short term goal 2: up/down flight of steps with rail & cane, PWB to L heel only with post-op shoe in place.   Short term goal 3: Sit<>stand S  Patient Goals

## 2021-10-24 VITALS
OXYGEN SATURATION: 93 % | BODY MASS INDEX: 37.98 KG/M2 | HEIGHT: 72 IN | DIASTOLIC BLOOD PRESSURE: 85 MMHG | WEIGHT: 280.43 LBS | HEART RATE: 80 BPM | RESPIRATION RATE: 17 BRPM | TEMPERATURE: 98.2 F | SYSTOLIC BLOOD PRESSURE: 136 MMHG

## 2021-10-24 LAB
ANION GAP SERPL CALCULATED.3IONS-SCNC: 13 MMOL/L (ref 3–16)
BASOPHILS ABSOLUTE: 0.1 K/UL (ref 0–0.2)
BASOPHILS RELATIVE PERCENT: 0.6 %
BUN BLDV-MCNC: 13 MG/DL (ref 7–20)
CALCIUM SERPL-MCNC: 8.4 MG/DL (ref 8.3–10.6)
CHLORIDE BLD-SCNC: 97 MMOL/L (ref 99–110)
CO2: 25 MMOL/L (ref 21–32)
CREAT SERPL-MCNC: 0.8 MG/DL (ref 0.9–1.3)
EOSINOPHILS ABSOLUTE: 0.3 K/UL (ref 0–0.6)
EOSINOPHILS RELATIVE PERCENT: 2.1 %
ESTIMATED AVERAGE GLUCOSE: 205.9 MG/DL
GFR AFRICAN AMERICAN: >60
GFR NON-AFRICAN AMERICAN: >60
GLUCOSE BLD-MCNC: 166 MG/DL (ref 70–99)
GLUCOSE BLD-MCNC: 178 MG/DL (ref 70–99)
GLUCOSE BLD-MCNC: 209 MG/DL (ref 70–99)
GLUCOSE BLD-MCNC: 223 MG/DL (ref 70–99)
HBA1C MFR BLD: 8.8 %
HCT VFR BLD CALC: 42.4 % (ref 40.5–52.5)
HEMOGLOBIN: 13.8 G/DL (ref 13.5–17.5)
LYMPHOCYTES ABSOLUTE: 1.7 K/UL (ref 1–5.1)
LYMPHOCYTES RELATIVE PERCENT: 12.3 %
MAGNESIUM: 2.1 MG/DL (ref 1.8–2.4)
MCH RBC QN AUTO: 29.3 PG (ref 26–34)
MCHC RBC AUTO-ENTMCNC: 32.6 G/DL (ref 31–36)
MCV RBC AUTO: 90 FL (ref 80–100)
MONOCYTES ABSOLUTE: 0.9 K/UL (ref 0–1.3)
MONOCYTES RELATIVE PERCENT: 6.1 %
NEUTROPHILS ABSOLUTE: 11.2 K/UL (ref 1.7–7.7)
NEUTROPHILS RELATIVE PERCENT: 78.9 %
PDW BLD-RTO: 14.6 % (ref 12.4–15.4)
PERFORMED ON: ABNORMAL
PLATELET # BLD: 173 K/UL (ref 135–450)
PMV BLD AUTO: 9.9 FL (ref 5–10.5)
POTASSIUM SERPL-SCNC: 3.8 MMOL/L (ref 3.5–5.1)
RBC # BLD: 4.72 M/UL (ref 4.2–5.9)
SODIUM BLD-SCNC: 135 MMOL/L (ref 136–145)
WBC # BLD: 14.2 K/UL (ref 4–11)

## 2021-10-24 PROCEDURE — G0008 ADMIN INFLUENZA VIRUS VAC: HCPCS | Performed by: INTERNAL MEDICINE

## 2021-10-24 PROCEDURE — 6370000000 HC RX 637 (ALT 250 FOR IP): Performed by: STUDENT IN AN ORGANIZED HEALTH CARE EDUCATION/TRAINING PROGRAM

## 2021-10-24 PROCEDURE — 83735 ASSAY OF MAGNESIUM: CPT

## 2021-10-24 PROCEDURE — 6360000002 HC RX W HCPCS: Performed by: STUDENT IN AN ORGANIZED HEALTH CARE EDUCATION/TRAINING PROGRAM

## 2021-10-24 PROCEDURE — 6360000002 HC RX W HCPCS: Performed by: INTERNAL MEDICINE

## 2021-10-24 PROCEDURE — 36415 COLL VENOUS BLD VENIPUNCTURE: CPT

## 2021-10-24 PROCEDURE — 90686 IIV4 VACC NO PRSV 0.5 ML IM: CPT | Performed by: INTERNAL MEDICINE

## 2021-10-24 PROCEDURE — 6370000000 HC RX 637 (ALT 250 FOR IP): Performed by: INTERNAL MEDICINE

## 2021-10-24 PROCEDURE — 80048 BASIC METABOLIC PNL TOTAL CA: CPT

## 2021-10-24 PROCEDURE — 85025 COMPLETE CBC W/AUTO DIFF WBC: CPT

## 2021-10-24 PROCEDURE — 6370000000 HC RX 637 (ALT 250 FOR IP)

## 2021-10-24 RX ORDER — LISINOPRIL 10 MG/1
10 TABLET ORAL DAILY
Qty: 30 TABLET | Refills: 3 | Status: SHIPPED | OUTPATIENT
Start: 2021-10-24 | End: 2021-10-24

## 2021-10-24 RX ORDER — ATORVASTATIN CALCIUM 80 MG/1
80 TABLET, FILM COATED ORAL NIGHTLY
Qty: 30 TABLET | Refills: 3 | Status: SHIPPED | OUTPATIENT
Start: 2021-10-24 | End: 2021-10-24

## 2021-10-24 RX ORDER — GLUCOSAMINE HCL/CHONDROITIN SU 500-400 MG
CAPSULE ORAL
Qty: 30 STRIP | Refills: 0 | Status: SHIPPED | OUTPATIENT
Start: 2021-10-24

## 2021-10-24 RX ORDER — ASPIRIN 81 MG/1
81 TABLET ORAL DAILY
Qty: 30 TABLET | Refills: 3 | Status: SHIPPED | OUTPATIENT
Start: 2021-10-24

## 2021-10-24 RX ORDER — ATORVASTATIN CALCIUM 80 MG/1
80 TABLET, FILM COATED ORAL NIGHTLY
Qty: 30 TABLET | Refills: 3 | Status: SHIPPED | OUTPATIENT
Start: 2021-10-24

## 2021-10-24 RX ORDER — METOPROLOL SUCCINATE 50 MG/1
50 TABLET, EXTENDED RELEASE ORAL DAILY
Qty: 30 TABLET | Refills: 3 | Status: SHIPPED | OUTPATIENT
Start: 2021-10-24

## 2021-10-24 RX ORDER — LANCETS 30 GAUGE
1 EACH MISCELLANEOUS 2 TIMES DAILY
Qty: 300 EACH | Refills: 1 | Status: SHIPPED | OUTPATIENT
Start: 2021-10-24

## 2021-10-24 RX ORDER — METOPROLOL SUCCINATE 50 MG/1
50 TABLET, EXTENDED RELEASE ORAL DAILY
Qty: 30 TABLET | Refills: 3 | Status: SHIPPED | OUTPATIENT
Start: 2021-10-24 | End: 2021-10-24

## 2021-10-24 RX ORDER — LISINOPRIL 10 MG/1
10 TABLET ORAL DAILY
Qty: 30 TABLET | Refills: 3 | Status: SHIPPED | OUTPATIENT
Start: 2021-10-24

## 2021-10-24 RX ORDER — ASPIRIN 81 MG/1
81 TABLET ORAL DAILY
Qty: 30 TABLET | Refills: 3 | Status: SHIPPED | OUTPATIENT
Start: 2021-10-24 | End: 2021-10-24

## 2021-10-24 RX ADMIN — HEPARIN SODIUM 5000 UNITS: 5000 INJECTION INTRAVENOUS; SUBCUTANEOUS at 16:37

## 2021-10-24 RX ADMIN — METOPROLOL SUCCINATE 50 MG: 50 TABLET, EXTENDED RELEASE ORAL at 09:36

## 2021-10-24 RX ADMIN — FUROSEMIDE 80 MG: 80 TABLET ORAL at 09:36

## 2021-10-24 RX ADMIN — INSULIN LISPRO 4 UNITS: 100 INJECTION, SOLUTION INTRAVENOUS; SUBCUTANEOUS at 12:41

## 2021-10-24 RX ADMIN — ONDANSETRON 4 MG: 2 INJECTION INTRAMUSCULAR; INTRAVENOUS at 11:31

## 2021-10-24 RX ADMIN — ASPIRIN 81 MG: 81 TABLET, COATED ORAL at 09:36

## 2021-10-24 RX ADMIN — INFLUENZA A VIRUS A/VICTORIA/2570/2019 IVR-215 (H1N1) ANTIGEN (PROPIOLACTONE INACTIVATED), INFLUENZA A VIRUS A/CAMBODIA/E0826360/2020 IVR-224 (H3N2) ANTIGEN (PROPIOLACTONE INACTIVATED), INFLUENZA B VIRUS B/VICTORIA/705/2018 BVR-11 ANTIGEN (PROPIOLACTONE INACTIVATED), INFLUENZA B VIRUS B/PHUKET/3073/2013 BVR-1B ANTIGEN (PROPIOLACTONE INACTIVATED) 0.5 ML: 15; 15; 15; 15 INJECTION, SUSPENSION INTRAMUSCULAR at 16:32

## 2021-10-24 RX ADMIN — HEPARIN SODIUM 5000 UNITS: 5000 INJECTION INTRAVENOUS; SUBCUTANEOUS at 06:21

## 2021-10-24 RX ADMIN — LISINOPRIL 10 MG: 10 TABLET ORAL at 09:36

## 2021-10-24 ASSESSMENT — PAIN SCALES - GENERAL: PAINLEVEL_OUTOF10: 0

## 2021-10-24 NOTE — CARE COORDINATION
Case Management Assessment            Discharge Note                    Date / Time of Note: 10/24/2021 2:02 PM                  Discharge Note Completed by: TREVOR German, JAVIERW    Patient Name: Isaura Siddiqui   YOB: 1965  Diagnosis: Acute CVA (cerebrovascular accident) [facility]) [I63.9]   Date / Time: 10/22/2021  3:36 AM    Current PCP: No primary care provider on file. Clinic patient: No    Hospitalization in the last 30 days: No    Advance Directives:  Code Status: Full Code  [place] DNR form completed and on chart: No    Financial:  Payor: /      Pharmacy:    [facility] [address] [place] [address], [facility]   [address] [place] [address]  Phone: [phone] Fax: [phone]      Assistance purchasing medications?:    Assistance provided by Case Management: None at this time    Does patient want to participate in local refill/ meds to beds program?:      Meds To Beds General Rules:  1. Can ONLY be done Monday- Friday between 8:30am-5pm  2. Prescription(s) must be in pharmacy by 3pm to be filled same day  3. Copy of patient's insurance/ prescription drug card and patient face sheet must be sent along with the prescription(s)  4. Cost of Rx cannot be added to hospital bill. If financial assistance is needed, please contact unit  or ;  or  CANNOT provide pharmacy voucher for patients co-pays  5.  Patients can then  the prescription on their way out of the hospital at discharge, or pharmacy can deliver to the bedside if staff is available. (payment due at time of pick-up or delivery - cash, check, or card accepted)     Able to afford home medications/ co-pay costs: Yes    ADLS:  Current PT AM-PAC Score: 18 /24  Current OT AM-PAC Score: 19 /24      DISCHARGE Disposition: Home- No Services Needed    LOC at discharge: Not Applicable  SHIRA Completed: Yes    Notification completed in HENS/PAS?:  Not Applicable    IMM Completed:   Not Indicated    Transportation:  Transportation PLAN for discharge: family     Home Care:  [address] ordered at discharge: No, Not [address]: Not Applicable  Orders faxed: No    Durable Medical Equipment:  DME Provider: [employer]  Equipment obtained during hospitalization: [address] and Respiratory Equipment:  Oxygen needed at discharge?: No  [address]: Not Applicable  Portable tank available for discharge?: Not Indicated    Dialysis:  Dialysis patient: No    Dialysis Center:  Not Applicable    Additional CM Notes: Pt from home alone, Pt to return home at DC w/No needs, Pt's family will transport home at DC. The Plan for Transition of Care is related to the following treatment goals of Acute CVA (cerebrovascular accident) [facility]) [I63.9]    The Patient and/or patient representative Eleanor Pedroza and his family were provided with a choice of provider and agrees with the discharge plan Yes    Freedom of choice list was provided with basic dialogue that supports the patient's individualized plan of care/goals and shares the quality data associated with the providers.  Not Indicated    Care Transitions patient: No    TREVOR Prasad, [facility]  Case Management Department  Ph: [phone]  Fax: [phone]

## 2021-10-24 NOTE — PROGRESS NOTES
No acute events overnight, VSS. Report called to 5T and pt transferred to 5520 with all pt belongings.

## 2021-10-24 NOTE — CONSULTS
Clinical Pharmacy Progress Note    All IV in NS - Management by Pharmacy    Consult Date(s): 10/22/21  Consulting Provider(s): Timothy    Assessment / Plan    Stroke  All IVs in Normal Saline   Drips will be adjusted to normal saline as appropriate based on compatibility, in an effort to avoid fluid shifts, as D5W is osmotically active.  The following intermittent IV drips / infusions have been adjusted to saline:  o None at this time   The following medications must remain in D5W due to incompatibility with normal saline:  o Amiodarone Infusion  o Amphotericin  o Mycophenolate  o Nitroprusside  o Penicillin G Potassium   Note: Patient has D5W ordered as part of hypoglycemia orderset.  Total IV fluid delivered to patient over last 24 hrs: Pushes + flushes only   RPh will follow daily to ensure all IVPBs / drips are in NS where possible. It has been 48 hours since this consult has began pharmacy will now sign off. Thank you for consulting Pharmacy!     Destini Kraus, PharmD  PGY-1 Pharmacy Resident  [id number]  10/24/2021 7:34 AM

## 2021-10-24 NOTE — DISCHARGE SUMMARY
Hospital Medicine Discharge Summary    Patient ID: Carmencita Jason      Patient's PCP: No primary care provider on file. Admit Date: 10/22/2021     Discharge Date:   10/24/21    Admitting Physician: Orestes Leiva MD     Discharge Physician: Orestes Leiva MD     Discharge Diagnoses: Active Hospital Problems    Diagnosis Date Noted    Elevated lipoprotein(a) [E78.41]     Acute CVA [facility]cerebrovascular accident) [facility] [address] [I63.9]     Cerebrovascular accident (CVA) due to occlusion of left middle cerebral artery ([facility] [address] [I63.512] 10/22/2021    Diabetes mellitus ([facility] [address] [E11.9]     CHF (congestive heart failure) ([facility] [address] [I50.9]     BMI 38.0-38.9,adult [Z68.38]        The patient was seen and examined on day of discharge and this discharge summary is in conjunction with any daily progress note from day of discharge. Hospital Course:   Acute ischemic stroke:  Pt was given tpA. Post tPA protocol was followed. HbA1c/lipid pane/Echo and MRI was ordered. Neurology was consulted. MRI showed acute cortical infarction 4 into 2 cm involving precentral gyrus and post central gyrus and continued small amount of hemorrhage extending. Pt was started aspirin and statin. Hemoglobin A1c was 8.8, lipid panel showed LDL 73/HDL 31/total cholesterol 118. Echo showed an EF of 10 to 15%. Patient work with therapy and did well. Neurology recommended discharge with outpatient follow-up.     Acute on chronic systolic heart failure:  Patient had elevated proBNP with evidence of pulmonary edema on chest x-ray. Received IV Lasix. Echo showed an EF of 10 to 15%. Patient was continued on home lisinopril and metoprolol. Cardiology was consulted and recommended ICD implantation. Patient wanted to proceed with that outpatient. Patient is being discharged in stable condition with recommendation to continue medication and follow-up with cardiology outpatient.     Hypertension:  Blood pressure was monitored.   Patient was continued on Lasix, lisinopril and metoprolol.       Type 2 diabetes mellitus:  Home medication were held. Patient hemoglobin A1c was checked. Blood glucose well monitored. Carb controlled diet with sliding scale insulin was continued. Hemoglobin A1c was 8.8. Discussed with patient regarding monitoring blood glucose and following up with PCP for better BG control.     Left foot diabetic ulceration:  Podiatry was consulted. Wound did not seem to be infected. Wound care was continued. Patient advised to follow-up with podiatry outpatient. Physical Exam Performed:     /85   Pulse 80   Temp 98.2 °F (36.8 °C) (Oral)   Resp 17   Ht 6' (1.829 m)   Wt 280 lb 6.8 oz (127.2 kg)   SpO2 93%   BMI 38.03 kg/m²     General appearance: No apparent distress, appears stated age and cooperative. HEENT: Pupils equal, round, and reactive to light. Conjunctivae/corneas clear. Neck: Supple, with full range of motion. No jugular venous distention. Trachea midline. Respiratory:  Normal respiratory effort. Clear to auscultation, bilaterally without Rales/Wheezes/Rhonchi. Cardiovascular: Regular rate and rhythm with normal S1/S2 without murmurs, rubs or gallops. Abdomen: Soft, non-tender, non-distended with normal bowel sounds. Musculoskeletal: Left foot in boot. Pictures from podiatry note reviewed  Skin: Skin color, texture, turgor normal.  No rashes or lesions. Neurologic: Cranial nerves intact. Right upper extremity weakness and wrist drop improving  Psychiatric: Alert and oriented, thought content appropriate, normal insight  Capillary Refill: Brisk,< 3 seconds   Peripheral Pulses: +2 palpable, equal bilaterally         Labs:  For convenience and continuity at follow-up the following most recent labs are provided:      CBC:    Lab Results   Component Value Date    WBC 14.2 10/24/2021    HGB 13.8 10/24/2021    HCT 42.4 10/24/2021     10/24/2021       Renal:    Lab Results   Component Value Date  10/24/2021    K 3.8 10/24/2021    K 3.6 10/22/2021    CL 97 10/24/2021    CO2 25 10/24/2021    BUN 13 10/24/2021    CREATININE 0.8 10/24/2021    CALCIUM 8.4 10/24/2021         Significant Diagnostic Studies    Radiology:   MRI BRAIN WO CONTRAST   Final Result      Acute cortical infarction 4 x 2 cm involving precentral gyrus and postcentral gyrus and containing small amount of hemorrhagic staining      Critical results reported to ICU nurse Ximena Gasca at 08:10 on 10/23/2021. XR FOOT LEFT (MIN 3 VIEWS)   Final Result   1. Soft tissue defect in the plantar forefoot. 2.  No radiographic evidence of osteomyelitis.       CT head without contrast    (Results Pending)          Consults:     IP CONSULT TO NEUROLOGY  IP CONSULT TO PHARMACY  PHARMACY TO CHANGE BASE FLUIDS  IP CONSULT TO PODIATRY  IP CONSULT TO PHARMACY  IP CONSULT TO CRITICAL CARE  IP CONSULT TO CARDIOLOGY    Disposition:  Home    Condition at Discharge: Stable    Discharge Instructions/Follow-up:  Monitor blood glucose  Take medication as directed  Follow up with cardiology  Follow up with neurology  Follow up with podiatry  Follow up with PCP    Code Status:  Full Code     Activity: activity as tolerated    Diet: diabetic diet      Discharge Medications:     Current Discharge Medication List           Details   atorvastatin (LIPITOR) 80 MG tablet Take 1 tablet by mouth nightly  Qty: 30 tablet, Refills: 3      aspirin 81 MG EC tablet Take 1 tablet by mouth daily  Qty: 30 tablet, Refills: 3              Details   lisinopril (PRINIVIL;ZESTRIL) 10 MG tablet Take 1 tablet by mouth daily  Qty: 30 tablet, Refills: 3      metoprolol succinate (TOPROL XL) 50 MG extended release tablet Take 1 tablet by mouth daily  Qty: 30 tablet, Refills: 3              Details   potassium chloride (KLOR-CON M) 20 MEQ extended release tablet Take 20 mEq by mouth 2 times daily      furosemide (LASIX) 80 MG tablet Take 80 mg by mouth daily      metFORMIN (GLUCOPHAGE) 500 MG tablet Take 1,000 mg by mouth 2 times daily (with meals)             Time Spent on discharge is more than 30 minutes in the examination, evaluation, counseling and review of medications and discharge plan. Signed:    Ezella Lesches, MD   10/24/2021      Thank you No primary care provider on file. for the opportunity to be involved in this patient's care. If you have any questions or concerns please feel free to contact me at [phone].

## 2021-10-24 NOTE — PLAN OF CARE
Problem: Falls - Risk of:  Goal: Will remain free from falls  Description: Will remain free from falls  Outcome: Ongoing  Note: Pt is in bed with alarm on. Non-skid socks are on. Up x1 with cane. Fall precautions in place. Will continue to monitor. Problem: Mobility - Impaired:  Goal: Able to ambulate with minimal assistance  Description: Able to ambulate with minimal assistance  Outcome: Ongoing  Note: Up as SBA. Steady on feet w/ cane.

## 2021-10-24 NOTE — PROGRESS NOTES
Pt is A/O x4. VSS. NIHSS 2. Neuro checks remain unchanged. Up x1 with walker and gait belt. Pt denies pain. Voiding adequately. SpO2 improving. Fall precautions in place. Will continue to monitor.

## 2021-10-25 LAB
GLUCOSE BLD-MCNC: 222 MG/DL (ref 70–99)
PERFORMED ON: ABNORMAL

## 2021-11-03 ENCOUNTER — HOSPITAL ENCOUNTER (OUTPATIENT)
Dept: OCCUPATIONAL THERAPY | Age: 56
Setting detail: THERAPIES SERIES
Discharge: HOME | End: 2021-11-03
Payer: MEDICAID

## 2021-11-03 NOTE — PLAN OF CARE
[facility] Outpatient Therapy  [address] [place] [address], [address], [address]  Phone: [phone]   Fax: [phone]                                               Occupational Therapy Certification    Dear Dr. Grant Aparicio MD,    We had the pleasure of evaluating the following patient for occupational therapy services at [facility]. A summary of our findings can be found in the initial assessment below. This includes our plan of care. If you have any questions or concerns regarding these findings, please do not hesitate to contact me at the office phone number checked above.   Thank you for the referral.         Physician Signature:_______________________________Date:__________________  By signing above (or electronic signature), therapist's plan is approved by physician      Patient: Anabelle Irizarry   : 1965   MRN: [id number]  Referring Physician: Dr. Grant Aparicio MD      Evaluation Date: 11/3/2021      Medical Diagnosis Information: I63.9 (ICD-10-CM) Cerebral infarction, unspecified  Treatment Diagnosis Information: Impaired R hand strength and ROM, decreased ADL status                                       Insurance information: None - Self-pay     Onset Date: 10/22/2021      Follow-Up Appointments: None, but advised to follow-up w/ podiatry and cardiology     Precautions/ Contra-indications: EF limited to 10-15%, Aspirin, uncontrolled blood sugars  Latex Allergy:  [x]NO      []YES   Preferred Language for Healthcare:   [x]English       []other:  C-SSRS Triggered by Intake questionnaire (Past 2 wk assessment):   [x] No, Questionnaire did not trigger screening.   [] Yes, Patient intake triggered further evaluation      [] C-SSRS Screening completed  [] PCP notified via Plan of Care  [] Emergency services notified    SUBJECTIVE: Pt presents to outpatient OT eval w/ complaints of impaired RUE function, decreased ADL and IADL status (reports that ADLs/IADLs are a \"chore\" - require increased time for completion) and inability to write with R hand. Pt reports that he tests his blood sugar 1x daily 1 hour after eating breakfast. Pt reports that blood sugar has been running high in the 200s recently. Pt reports that his goals for therapy include increasing functional use of R hand and to return to his PLOF. History of Present Illness: Pt is a 60 y.o. male w/ a PMHx of CHF and DM2 who [facility] w/ RUE weakness on 10/22/2021. CT and CT angiogram were obtained and did not show intracranial hemorrhage. Following CT, pt experienced RLE weakness. Pt was given [facility] and transferred by air to ICU at [facility]. Upon arriving at [facility], MRI was ordered and showed acute cortical infarction 4 into 2 cm involving precentral gyrus and post-central hyrus and continued small amount of hemorrhage extending. Pt presented w/ L foot wound w/ partial weightbearing status, which pt reports has now healed and no current weightbearing precautions in place. Pt D/C'd home on 10/24/2021 in stable condition w/ recommendation for outpatient OT follow-up.         Relevant Medical History:   Past Medical History:   Diagnosis Date    [facility]F (congestive heart failure) ([facility]     Diabetes mellitus ([facility]      Co-morbidities/Complexities (which will affect course of rehabilitation):   []None           Arthritic conditions   []Rheumatoid arthritis (M05.9)  []Osteoarthritis (M19.91)   Cardiovascular conditions   []Hypertension (I10)  []Hyperlipidemia (E78.5)  []Angina pectoris (I20)  []Atherosclerosis (I70)   Musculoskeletal conditions   []Disc pathology   []Congenital spine pathologies   []Prior surgical intervention  []Osteoporosis (M81.8)  []Osteopenia (M85.8)   Endocrine conditions   []Hypothyroid (E03.9)  []Hyperthyroid Gastrointestinal conditions   []Constipation (L18.65)   Metabolic conditions   []Morbid obesity (E66.01)  []Obesity (E66)  []Diabetes type 1(E10.65)  [x]Diabetes type 2 (E11.65)  []Neuropathy (G60.9)       Pulmonary conditions   []Asthma (J45)  []Coughing   []COPD (J44.9)   Psychological Disorders  []Anxiety (F41.9)  []Depression (F32.9)   []Bipolar (F31.9)   Neurological conditions  []CVA (I69)  []Parkinsons (G20)  [x]Other: CHF         Pain No  Patient reports pain is 0/10 pain at present and 0/10 pain at its worst.  Pain increases with: N/A        Decreases with: N/A    Pain description:  N/A     Current Functional Limitations:   Functional Complaints: Pt reports that d/t impaired functional use of R hand, completion of ADLs/IADLs requires increased time, and that he is utilizing non-dominant hand for completion of ADLs/IADLs, and reduced participation in leisure activities/hobbies and work. PLOF:  No functional limitations  Pt's sleep is affected?  Yes, by stress s/p CVA    Social support/Environment:    Lives with: alone (2 dogs and 3 cats), brother is available for assistance prn    Family/caregiver support needed prior to current illness: No      Home Environment:  2-story, Laundry in basement, 1/2 bath on 1st floor and Main bath/bedroom upstairs   Number of TEMO: 15 w/ L handrail ascending; pt reports that there are ~12 steps to get to second floor and ~10 steps to get into basement    Bathroom Accessibility: Accessible     Bathroom Environment: Tub/shower combo    Other Equipment:  Quad cane, given to him by hospital, but not using it for functional mobility currently    Occupation/School: Full-time employment as a cook for [employer]: Yes    Leisure/Hobbies: eROI games, walking the dogs    Other Comments: No falls    OBJECTIVE:     Hand Dominance: right    Observations:  Posture: Slouched posture, posterior pelvic tilt   Bandages/Dressings/Incisions: no  Edema: no    Functional Outcome Measure:    Date Assessed: 11/3/2021  Measure Used: UEFS  Score: 33.75/100 (40-59% disability)    ADL and IADL Status: Current ADL Status: Independent, requires increased time for completion of tasks; currently using L (non-dominant) hand for completion of most ADL tasks. Pt reports using R hand to \"assist\" w/ dressing, such as putting R hand in pant elastic waistband but not able to pull pants up with R hand. Current IADL Status: Independent and Requires increase time    Cognitive Status: WFL    Cardiopulmonary Status   [x]NT  Blood pressure:   Heart Rate:   SpO2:     Tone     Normal  Location: Bilateral    Trunk Control     Good     Vision:   Impaired  Wears glasses: All the time  Visual Tracking: NT  Visual Fields: NT     Visual Perceptual: NT  MVPT: NT  Trails A: NT  Trails B: NT    Hearing: WNL    Sensation  - Pt reports that R hand used to have tingling sensations when in the hospital, but recently R hand feels \"asleep\" sometimes. Light touch:   WNL     Proprioception:  NT    Sharp/dull:  WNL     Stereognosis: NT      Upper Extremity ROM and Strength Assessment: Items not marked are items that are NT         RUE    RUE     RUE     LUE       LUE        LUE    PROM AROM MMT PROM AROM MMT   Shoulder         Flexion    (0-180)    WFL           Abduction (0-180)    WFL           Extension (0-45)               ER    (0-90)               IR   (0-70)                              Elbow               Flexion (0-145)               Extension (145-0)               Supination (0-90)         Pronation (0-90)                  Wrist              Flexion   (0-80)    WFL  3         Extension (0-70)    WFL  4         Radial Deviation (0-20)    WFL  3         Ulnar Deviation (0-45)    WFL  4                          Hand ROM Assessment: Impaired right         Right    Right     Left     Left     PROM AROM PROM AROM   1st Digit        CMC Palmar Add/abduction (0-45)   WFL 0     CMC Radial Add/Abduction (0-45)   WFL 0     MCP Extension-Flexion (0-50)   WFL 0     IP Extension-Flexion (0-80)   WFL Maintains at 15                       2nd Digit       MCP Extension-Flexion (0-90)   WFL 7-31     PIP Extension-Flexion  (0-100)   WFL 48-68     DIP Extension/Flexion (0-70)   WFL 19-19                        3rd Digit       MCP Extension-Flexion (0-90)   WFL 44-47     PIP Extension-Flexion  (0-100)   WFL 53-79     DIP Extension/Flexion (0-70)   WFL 33-54                        4th Digit       MCP Extension-Flexion (0-90)   WFL 30-51     PIP Extension-Flexion  (0-100)   WFL 78-90     DIP Extension/Flexion (0-70)    WFL  45-55                         5th Digit       MCP Extension-Flexion (0-90)   WFL 41-60     PIP Extension-Flexion  (0-100)   WFL 70-90     DIP Extension/Flexion (0-70)   WFL 37-55       Hand Strength Assessment: Impaired right     Strength (pounds)  --R hand: 18# which is less than 10th percentile for pt's age. --L hand: NT  Required support for holding dynamometer and Min A for maintaining RUE shoulder adduction to inhibit compensations. Coordination Testing:       Box and Blocks Test (blocks/minute): Standing  --R hand: 3 blocks/minute, which is less than mean for pt's age. --L hand: NT  Observation: utilized digits 3-5 to grasp blocks d/t increased impairment/decreased ROM of digits 1-2 and left lateral lean noted. Functional Mobility    Device used: No device required  Level of assist: Independent    Balance    Static Sitting: Good        Dynamic Sitting: Good    Static Stance: Good        Dynamic Stance: Good                      [x] Patient history, allergies, meds reviewed. Medical chart reviewed. See intake form. Review Of Systems (ROS):  [x]Performed Review of systems (Integumentary, CardioPulmonary, Neurological) by intake and observation. Intake form has been scanned into medical record. Patient has been instructed to contact their primary care physician regarding ROS issues if not already being addressed at this time.         Barriers to/and or personal factors that will affect rehab potential: Co-morbidities ASSESSMENT    Pt is a 60 y.o. male who presented to OP skilled OT evaluation with c/o impaired functional use of R hand following L CVA on 10/22/2021. Based on assessments completed this date, pt presents w/ decreased strength, ROM, and fine motor coordination of R hand decreasing pt's ability to complete ADLs and IADLs in a timely manner. Pt would benefit from skilled OT services to address the above deficits to increase functional use of R hand to return to PLOF for improved quality of life and functional independence in everyday activities.      Functional Impairments: Decreased ROM, Decreased strength, Decreased ADL status, Decrease functional activity tolerance, Decreased high level IADLs, Decreased sensation, Decreased posture, Decreased coordination, Decreased fine motor control, Decreased vision/visual deficit and Decreased functional mobility     Functional Activity Limitations (from functional questionnaire and intake): Reduced ability to perform self-care, Reduced ability to sleep, Reduced ability or tolerance with driving  and Reduced ability to perform lifting, reaching, and/or carrying tasks     Participation Restrictions: Reduced participation in self-care activities, Reduced participation in home management activities, Reduced participation in work related activities, Reduced participation in social activities and Reduced participation in sport/recreational activities    Tolerance of evaluation/treatment: Good      Occupational Therapy Evaluation Complexity Justification    [x] A history of present problem with:  [] brief history including review of medical records relating to the problem; no personal factors and/or comorbidities that impact the plan of care  [x] expanded review of medical records and additional review of physical, cognitive, or psychosocial history related to current functional performance  [] extensive review of medical records and additional review of physical, cognitive, or psychosocial history related to current functional performance    [x] An examination of body systems using standardized tests and measures addressing any of the following: body structures and functions (impairments), activity limitations, and/or participation restrictions:  [] a total of 1-3 elements   [] a total of 3-5 elements   [x] a total of 5 or more elements     [x] A clinical presentation with:  [] stable and/or uncomplicated characteristics; no personal factors and/or comorbidities that impact the plan of care  [x] evolving clinical presentation w/ changing characteristics; min/mod assistance or modifications required to perform tasks. May have comorbidities that affect occupational performance  [] unstable and unpredictable characteristics; significant assistance or modifications required to perform tasks. Have comorbidities that affect occupational performance. [x] Clinical decision making of [] low, [] moderate, [x] high complexity using standardized patient assessment instrument and/or measurable assessment of functional outcome.      [] EVAL (LOW) 70184 (typically 20 minutes face-to-face)  [] EVAL (MOD) 32148 (typically 30 minutes face-to-face)  [x] EVAL (HIGH) 56670 (typically 45 minutes face-to-face)  [] RE-EVAL 96284      PLAN     Frequency/Duration:  3 days per week for 6 Weeks, then 2 days per week for 6 weeks:    Interventions/Procedural Codes:  [x]  Therapeutic exercise (61814) including strength training, ROM, endurance training and flexibility  [x]  Neuromuscular re-education (90452) including facilitation of normal movement patterns, promoting postural alignment and stability during static and dynamic movement (sitting or standing), visual perceptual training, proprioception training, balance retraining during functional activities  [x]  Manual therapy ([id number]) including tissue mobilization, joint mobilization, massage, passive ROM   [x]  Modalities as needed that may include thermal agents, attended E-stim (23149), Biofeedback, US (66762), iontophoresis as indicated  [x]  Therapeutic activity (24923) that may include patient/caregiver education/training, activity modification, increasing participation and independence with functional activities seated/standing, engagement in fine and gross motor activities, functional transfers and mobility  [] Sensory integration techniques (23795) that include enhancing sensory processing and promoting responses that are adaptive to environmental demands  [x] Self-care/home management training (81507) that includes restorative and compensatory training in activities of daily living, meal preparation, laundry and other various house maintenance activities. May include education and training in use of adaptive equipment/devices and assistive technology to promote participation and independence. [] Cognitive function (59638 initial 15 minutes, 51794 each additional 15 minutes w/ maximum of 3 units billable) activities that address attention, memory, reasoning, executive functioning, problem solving, and/or pragmatic functioning in addition to compensatory strategies to manage the performance of an activity (for example, managing time or schedules, initiating, organizing, and sequencing tasks). GOALS:  Patient stated goal: To increase functional use of R hand. [] Progressing: [] Met: [] Not Met: [] Adjusted    Therapist goals for Patient: To increase functional use of R hand for improved functional independence in completion of everyday activities and for return to work and leisure activities. Short Term Goals: To be achieved in: 6 weeks  1. Pt will improve score on UEFS to 70/100 for a subjective report of decreased difficulty with completing every day activities with RUE  [] Progressing: [] Met: [] Not Met: [] Adjusted  2. Pt will improve  strength to 30# to demonstrate improved functional grasp and container management of right hand.    [] Progressing: [] Met: [] Not Met: [] Adjusted  3. Pt will improve score on BBT to 25 blocks/minute to demonstrate improved timing, speed and accuracy with functional grasp of right hand. [] Progressing: [] Met: [] Not Met: [] Adjusted  4. Pt will participate in NHPT to demonstrate improved fine motor coordination of R hand. [] Progressing: [] Met: [] Not Met: [] Adjusted  5. Pt will write the letters of the alphabet with a built-up pen with 70% legibility for improved handwriting with R hand. [] Progressing: [] Met: [] Not Met: [] Adjusted   6. Pt will be Supervision with HEP/Home activities program to facilitate independence with carryover from sessions and to progress towards returning to PLOF  [] Progressing: [] Met: [] Not Met: [] Adjusted       Long Term Goals: To be achieved in: 12 weeks  1. Pt will improve score on UEFS to 85/100 for a subjective report of decreased difficulty with completing every day activities with RUE  [] Progressing: [] Met: [] Not Met: [] Adjusted  2. Pt will improve  strength to 60# to demonstrate improved functional grasp and container management of right hand. [] Progressing: [] Met: [] Not Met: [] Adjusted   3. Pt will improve score on BBT to 50blocks/minute to demonstrate improved timing, speed and accuracy with functional grasp of right hand. [] Progressing: [] Met: [] Not Met: [] Adjusted  4. Pt will improve time on NHPT to no more than 2 minutes to demonstrate improved fine motor coordination of R hand. [] Progressing: [] Met: [] Not Met: [] Adjusted  5. Pt will write the letters of the alphabet with a standard pen with 85% legibility for improved handwriting with R hand. [] Progressing: [] Met: [] Not Met: [] Adjusted   6.  Pt will be Independent with HEP/Home activities program to facilitate independence with carryover from sessions and to progress towards returning to PLOF  [] Progressing: [] Met: [] Not Met: [] Adjusted      Timed Code Treatment Minutes: 15    Total Treatment Minutes:  60   High Complexity Eval (45) and TA (15)      Electronically signed by:  BAL Navarro

## 2021-11-03 NOTE — FLOWSHEET NOTE
[facility] Outpatient Therapy  [address] [place] [address], [address] [place] [address]  Phone: [phone]   Fax: [phone]    Occupational Therapy Treatment Note/ Progress Report:     Date:  11/3/2021    Patient Name:  Ivone Bedoya    :  1965  MRN: [id number]      Medical/Treatment Diagnosis Information:  I63.9 (ICD-10-CM) Cerebral infarction, unspecified  Impaired R hand strength and ROM, decreased ADL status       Insurance/Certification information: None  Physician Information:  Dr. Casimiro Cerda MD  Plan of care signed:    No    Date of Patient follow up with Physician: Unknown     Progress Report: []  Yes  [x]  No     Date Range for reporting period:  Beginnin/3/2021  Ending:      Progress report due:      Recertification due (POC duration/ or 90 days whichever is less): 2022     Visit # Insurance Allowable Auth Needed   1 Private Pay No     Latex Allergy:  [x]NO      []YES  Preferred Language for Healthcare:   [x]English       []other:  Functional Scale: UEFS (11/3/2021)  -- 33.75/100    Pain level:  0/10     SUBJECTIVE:  See eval    OBJECTIVE: See eval   Observation:    Test measurements:      RESTRICTIONS/PRECAUTIONS: EF 10-15%, Aspirin    Therapeutic Activities:    Activity #1:     Patient Education:  Role of OT in OP setting -  verb understanding  POC and goals - verb understanding  Purpose of assessment and various areas to be assessed - verb understanding  Discussed acquiring a PCP to manage uncontrolled blood sugar and for prevention s/p CVA - verb understanding  Advised following up w/ podiatry for L food wound and cardiology for CHF - verb understanding    Therapeutic Exercises: Exercises in bold performed in department today. Items not bolded are carried forward from prior visits for continuity of the record.     Exercise/Equipment Resistance/Repetitions HEP Other comments       []        []        []        []        []        [] []        []        []          []         []        []        []        []        []        []        []        []      Home Exercise Program:      Therapeutic Exercise:   [] (19004) Provided verbal/tactile cueing for activities related to strengthening, flexibility, endurance, ROM. Includes review/progression of HEP activities related to strengthening, flexibility, endurance, AROM, proximal shoulder and UE for functional transfers/mobility, self-care, IADLs, and community re-entry    Therapeutic Activities:    [x] (82847) Provided verbal/tactile cueing for activities related to improving balance, coordination, kinesthetic sense, posture, motor skill, proprioception and motor activation to allow for proper function of core, proximal shoulder and UE with self-care, and ADLs, IADLs, functional mobility, work-related and leisure based activities. Includes review/progression of HEP activities to improve balance, coordination, kinesthetic sense, posture, motor skill, proprioception, proximal shoulder and UE for functional transfers/mobility, self-care, IADLs, and community re-entry    Sensory Integration Techniques:  [] (76188)Provided verbal/tactile feedback to enhance sensory processing and promoting responses that are adaptive to environmental demands    Self-Care/Home Management Training:  [] (55428) Provided training and education in restorative and compensatory strategies/activity modifications in activities of daily living, meal preparation, laundry and other various house maintenance activities. Provided training and education in use of adaptive equipment/devices and assistive technology, as needed, to promote participation and independence.     Cognitive Function:  [] (96362 x15 minutes, 43502 +15 minutes) Integrated activities that address attention, memory, reasoning, executive functioning, problem solving, and/or pragmatic functioning in addition to compensatory strategies to manage the performance of an activity (for example, managing time or schedules, initiating, organizing, and sequencing tasks). NMR:  [] (00873) During functional activities/therapeutic exercises, provided facilitation of normal movement patterns and provided handling/verbal cues to promote postural alignment and stability during static and dynamic movement (sitting or standing). Activities may also include visual perceptual training, proprioception training, and balance retraining during functional activities    Manual Treatments:    [] (20124) Provided manual therapy to mobilize UB for the purpose of modulating pain, promoting relaxation,  increasing ROM, reducing/eliminating soft tissue swelling/inflammation/restriction, improving soft tissue extensibility and allowing for proper ROM for normal function with self-care, functional mobility, transfers, reaching and lifting    Modalities:     [] Electrical Stimulation (50002):     [] Ultrasound (16566):    Charges:  Timed Code Treatment Minutes: 15   Total Treatment Minutes: 60      [] EVAL (LOW) 40061 (typically 20 minutes face-to-face)  [] EVAL (MOD) 25946 (typically 30 minutes face-to-face)  [x] EVAL (HIGH) 85146 (typically 45 minutes face-to-face)  [] RE-EVAL     [] OV(41943) x      [] NMR (65265) x       [] Manual ([address] [facility]) x       [x] TA (35926) x15 (1)       [] ES(attended) (21617)       [] ES (un) (59275)  [] Other:    GOALS:   Patient stated goal: To increase functional use of R hand. []? Progressing: []? Met: []? Not Met: []? Adjusted     Therapist goals for Patient: To increase functional use of R hand for improved functional independence in completion of everyday activities and for return to work and leisure activities.      Short Term Goals: To be achieved in: 6 weeks  1. Pt will improve score on UEFS to 70/100 for a subjective report of decreased difficulty with completing every day activities with RUE  []? Progressing: []? Met: []? Not Met: []? Adjusted  2.  Pt will improve  strength to 30# to demonstrate improved functional grasp and container management of right hand. []? Progressing: []? Met: []? Not Met: []? Adjusted  3. Pt will improve score on BBT to 25 blocks/minute to demonstrate improved timing, speed and accuracy with functional grasp of right hand. []? Progressing: []? Met: []? Not Met: []? Adjusted  4. Pt will participate in NHPT to demonstrate improved fine motor coordination of R hand. []? Progressing: []? Met: []? Not Met: []? Adjusted  5. Pt will write the letters of the alphabet with a built-up pen with 70% legibility for improved handwriting with R hand. []? Progressing: []? Met: []? Not Met: []? Adjusted   6. Pt will be Supervision with HEP/Home activities program to facilitate independence with carryover from sessions and to progress towards returning to PLOF  []? Progressing: []? Met: []? Not Met: []? Adjusted    Long Term Goals: To be achieved in: 12 weeks  1. Pt will improve score on UEFS to 85/100 for a subjective report of decreased difficulty with completing every day activities with RUE  []? Progressing: []? Met: []? Not Met: []? Adjusted  2. Pt will improve  strength to 60# to demonstrate improved functional grasp and container management of right hand. []? Progressing: []? Met: []? Not Met: []? Adjusted   3. Pt will improve score on BBT to 50blocks/minute to demonstrate improved timing, speed and accuracy with functional grasp of right hand. []? Progressing: []? Met: []? Not Met: []? Adjusted  4. Pt will improve time on NHPT to no more than 2 minutes to demonstrate improved fine motor coordination of R hand. []? Progressing: []? Met: []? Not Met: []? Adjusted  5. Pt will write the letters of the alphabet with a standard pen with 85% legibility for improved handwriting with R hand. []? Progressing: []? Met: []? Not Met: []? Adjusted   6.  Pt will be Independent with HEP/Home activities program to facilitate independence with carryover from sessions and to progress towards returning to PLOF  []? Progressing: []? Met: []? Not Met: []? Adjusted    ASSESSMENT:  See eval    Treatment/Activity Tolerance:  [x] Patient tolerated treatment well [] Patient limited by fatique  [] Patient limited by pain  [] Patient limited by other medical complications  [] Other:     Overall Progression Towards Functional goals/ Treatment Progress Update:  [] Patient is progressing as expected towards functional goals listed. [] Progression is slowed due to complexities/Impairments listed. [] Progression has been slowed due to co-morbidities. [x] Plan just implemented, too soon to assess goals progression <30days   [] Goals require adjustment due to lack of progress  [] Patient is not progressing as expected and requires additional follow up with physician  [] Other    Prognosis for POC: [x] Good [] Fair  [] Poor    Patient requires continued skilled intervention: [x] Yes  [] No        PLAN: See eval  [] Continue per plan of care [] Alter current plan (see comments)  [x] Plan of care initiated [] Hold pending MD visit [] Discharge    Electronically signed by: BAL Medina    Note: If patient does not return for scheduled/recommended follow up visits, this note will serve as a discharge from care along with the most recent update on progress.

## 2021-11-08 ENCOUNTER — HOSPITAL ENCOUNTER (OUTPATIENT)
Dept: OCCUPATIONAL THERAPY | Age: 56
Setting detail: THERAPIES SERIES
Discharge: HOME | End: 2021-11-08
Payer: MEDICAID

## 2021-11-08 PROCEDURE — 97530 THERAPEUTIC ACTIVITIES: CPT

## 2021-11-08 PROCEDURE — 97110 THERAPEUTIC EXERCISES: CPT

## 2021-11-08 NOTE — FLOWSHEET NOTE
[facility] Outpatient [facility]  [address], [address], [address]  Phone: [phone]   Fax: [phone]    Occupational Therapy Treatment Note:     Date:  2021    Patient Name:  Volanda Cushing    :  1965  MRN: [id number]      Medical/Treatment Diagnosis Information:  I63.9 (ICD-10-CM) Cerebral infarction, unspecified  Impaired R hand strength and ROM, decreased ADL status       Insurance/Certification information: None  Physician Information:  Dr. Alyssa Church MD  Plan of care signed:    No    Date of Patient follow up with Physician: Unknown     Progress Report: []  Yes  [x]  No     Date Range for reporting period:  Beginnin/3/2021  Ending:      Progress report due: 48/3/6725     Recertification due (POC duration/ or 90 days whichever is less): 2022     Visit # Insurance Allowable Auth Needed   2 Private Pay No     Latex Allergy:  [x]NO      []YES  Preferred Language for Healthcare:   [x]English       []other:  Functional Scale: UEFS (2021)  -- 33.75/100    Pain level:  0/10    SUBJECTIVE:  Pt reports that he didn't sleep well last night d/t tension headache in the occipital region. OBJECTIVE:    Observation: Guarding of RUE noted when entering therapy session this date.  Test measurements:      RESTRICTIONS/PRECAUTIONS: EF 10-15%, Aspirin    Therapeutic Activities:    Activity #1: To work on functional grasping with R hand, pt was instructed to grasp and release containers of various sizes w/ application of electrical stimulation to the extensor muscles of the wrist and digits to promote finger extension for grasping. Pt completed task for 3 containers x5 reps each. Pt then instructed to grasp and release one container without application of electrical stimulation x5 reps. Pt required increased time and demo'd increased difficulty extending fingers/opening hand for grasping and releasing when electrical stimulation was removed. Activity #2: Pt worked on fine motor coordination and neat pincer grasping of R hand through stacking large blocks. Pt instructed to utilize thumb and index finger to  blocks one at a time and stack on top of each other. Pt completed 4 towers x3 blocks w/ increased time and demo'd increased difficulty extending thumb in order to release blocks, however, on 3rd and 4th sets pt was demo'ing increased fine motor control and pressure grading. Modalities:   RUE Wrist Extensors: VMS burst 50 bps 5/5 cycle with 0.5 second ramp. Amplitude 26 mA CC. Used during seated RUE therapeutic exercises listed below and during therapeutic activity listed above. Used for 8 minutes total between exercises and TA. Pt reported muscle fatigue following 8 mins of application. Patient Education:  --Education on purpose of electrical stimulation to retrain extensor muscles and brain for improved grasping of R hand - verb understanding      Therapeutic Exercises: Exercises in bold performed in department today. Items not bolded are carried forward from prior visits for continuity of the record. Exercise/Equipment Resistance/Repetitions HEP Other comments     Finger Abduction/Adduction 2 sets x8 reps [] Mod A for abduction, and Min A for adduction for both sets. Digit 2-5 MCP Flexion/Extension 2 sets x8 reps [] Min A for flexion and Mod A for extension for first set. Required Min A for inhibiting flexion of DIP and PIP. Applied E-stim for second set. Required Min A for flexion and Min A for extension. Demo'd increased activation of the extensor musculature w/ application of e-stim. Required Min A for inhibiting flexion of DIP and PIP. Digit 2-5 DIP flexion/extension 2 sets x8 reps  Mod A for flexion and Max A for extension. Digit 2-5 PIP Flexion/extension 2 sets x8 reps [] SBA for flexion and required Mod A for extension. Required min A for inhibiting DIP flexion.       Thumb Radial Abduction/Adduction 2 sets x8 reps [] Mod A for adduction and max A for abduction. Required min A for inhibiting IP flexion. Thumb IP Flexion/Extension 2 sets x8 reps [] Min A for flexion and Max A for extension. Thumb Flexion/Extension 2 sets x8 reps [] Min A for flexion and Mod A for extension. Required min A for inhibiting IP flexion. Home Exercise Program:      Therapeutic Exercise:   [x] (74685) Provided verbal/tactile cueing for activities related to strengthening, flexibility, endurance, ROM. Includes review/progression of HEP activities related to strengthening, flexibility, endurance, AROM, proximal shoulder and UE for functional transfers/mobility, self-care, IADLs, and community re-entry    Therapeutic Activities:    [x] (16034) Provided verbal/tactile cueing for activities related to improving balance, coordination, kinesthetic sense, posture, motor skill, proprioception and motor activation to allow for proper function of core, proximal shoulder and UE with self-care, and ADLs, IADLs, functional mobility, work-related and leisure based activities. Includes review/progression of HEP activities to improve balance, coordination, kinesthetic sense, posture, motor skill, proprioception, proximal shoulder and UE for functional transfers/mobility, self-care, IADLs, and community re-entry    Sensory Integration Techniques:  [] (02425)Provided verbal/tactile feedback to enhance sensory processing and promoting responses that are adaptive to environmental demands    Self-Care/Home Management Training:  [] (53817) Provided training and education in restorative and compensatory strategies/activity modifications in activities of daily living, meal preparation, laundry and other various house maintenance activities. Provided training and education in use of adaptive equipment/devices and assistive technology, as needed, to promote participation and independence.     Cognitive Function:  [] (97129 x15 minutes, 64967 +15 minutes) Integrated activities that address attention, memory, reasoning, executive functioning, problem solving, and/or pragmatic functioning in addition to compensatory strategies to manage the performance of an activity (for example, managing time or schedules, initiating, organizing, and sequencing tasks). NMR:  [] (95662) During functional activities/therapeutic exercises, provided facilitation of normal movement patterns and provided handling/verbal cues to promote postural alignment and stability during static and dynamic movement (sitting or standing). Activities may also include visual perceptual training, proprioception training, and balance retraining during functional activities    Manual Treatments:    [] (97402) Provided manual therapy to mobilize UB for the purpose of modulating pain, promoting relaxation,  increasing ROM, reducing/eliminating soft tissue swelling/inflammation/restriction, improving soft tissue extensibility and allowing for proper ROM for normal function with self-care, functional mobility, transfers, reaching and lifting    Modalities:     [] Electrical Stimulation (21640): Used during TE and TA this date. [] Ultrasound (06068):    Charges:  Timed Code Treatment Minutes: 55   Total Treatment Minutes: 55      [] EVAL (LOW) 47037 (typically 20 minutes face-to-face)  [] EVAL (MOD) 19398 (typically 30 minutes face-to-face)  [] EVAL (HIGH) 03283 (typically 45 minutes face-to-face)  [] RE-EVAL     [x] OG(55119) x 40 (3)      [] NMR (21490) x       [] Manual (54475) x       [x] TA (26361) x15 (1)       [] ES(attended) (58065)       [] ES (un) (71026)  [] Other:    GOALS:   Patient stated goal: To increase functional use of R hand. []? Progressing: []? Met: []? Not Met: []? Adjusted     Therapist goals for Patient:  To increase functional use of R hand for improved functional independence in completion of everyday activities and for return to work and leisure activities.      Short Term Goals: To be achieved in: 6 weeks  1. Pt will improve score on UEFS to 70/100 for a subjective report of decreased difficulty with completing every day activities with RUE  []? Progressing: []? Met: []? Not Met: []? Adjusted  2. Pt will improve  strength to 30# to demonstrate improved functional grasp and container management of right hand. []? Progressing: []? Met: []? Not Met: []? Adjusted  3. Pt will improve score on BBT to 25 blocks/minute to demonstrate improved timing, speed and accuracy with functional grasp of right hand. []? Progressing: []? Met: []? Not Met: []? Adjusted  4. Pt will participate in NHPT to demonstrate improved fine motor coordination of R hand. []? Progressing: []? Met: []? Not Met: []? Adjusted  5. Pt will write the letters of the alphabet with a built-up pen with 70% legibility for improved handwriting with R hand. []? Progressing: []? Met: []? Not Met: []? Adjusted   6. Pt will be Supervision with HEP/Home activities program to facilitate independence with carryover from sessions and to progress towards returning to PLOF  []? Progressing: []? Met: []? Not Met: []? Adjusted    Long Term Goals: To be achieved in: 12 weeks  1. Pt will improve score on UEFS to 85/100 for a subjective report of decreased difficulty with completing every day activities with RUE  []? Progressing: []? Met: []? Not Met: []? Adjusted  2. Pt will improve  strength to 60# to demonstrate improved functional grasp and container management of right hand. []? Progressing: []? Met: []? Not Met: []? Adjusted   3. Pt will improve score on BBT to 50blocks/minute to demonstrate improved timing, speed and accuracy with functional grasp of right hand. []? Progressing: []? Met: []? Not Met: []? Adjusted  4. Pt will improve time on NHPT to no more than 2 minutes to demonstrate improved fine motor coordination of R hand. []? Progressing: []? Met: []? Not Met: []? Adjusted  5.  Pt will write the letters of the alphabet with a standard pen with 85% legibility for improved handwriting with R hand. []? Progressing: []? Met: []? Not Met: []? Adjusted   6. Pt will be Independent with HEP/Home activities program to facilitate independence with carryover from sessions and to progress towards returning to PLOF  []? Progressing: []? Met: []? Not Met: []? Adjusted    ASSESSMENT:    Pt tolerated therapeutic exercises this date for improved strength and ROM of R hand to facilitate functional use. Pt benefits from application of electrical stimulation to wrist and digit extensor muscles to facilitate finger extension during completion of therapeutic exercises and therapeutic activities. Pt required increased time and demo'd increased difficulty with fine motor coordination tasks secondary to impaired digit dexterity and strength of R hand. Pt benefits from repetitive task practice to relearn normal movement patterns and for improved timing, speed, and coordination of R hand. Pt cont to benefit from skilled OT services - cont per POC. Treatment/Activity Tolerance:  [x] Patient tolerated treatment well [] Patient limited by fatique  [] Patient limited by pain  [] Patient limited by other medical complications  [] Other:     Overall Progression Towards Functional goals/ Treatment Progress Update:  [] Patient is progressing as expected towards functional goals listed. [] Progression is slowed due to complexities/Impairments listed. [] Progression has been slowed due to co-morbidities.   [x] Plan just implemented, too soon to assess goals progression <30days   [] Goals require adjustment due to lack of progress  [] Patient is not progressing as expected and requires additional follow up with physician  [] Other    Prognosis for POC: [x] Good [] Fair  [] Poor    Patient requires continued skilled intervention: [x] Yes  [] No        PLAN:   [x] Continue per plan of care [] Alter current plan (see comments)  [] Plan of care initiated [] Hold pending MD visit [] Discharge    Electronically signed by: BAL Oneal    Note: If patient does not return for scheduled/recommended follow up visits, this note will serve as a discharge from care along with the most recent update on progress.

## 2021-11-09 ENCOUNTER — HOSPITAL ENCOUNTER (OUTPATIENT)
Dept: OCCUPATIONAL THERAPY | Age: 56
Setting detail: THERAPIES SERIES
Discharge: HOME | End: 2021-11-09
Payer: MEDICAID

## 2021-11-09 PROCEDURE — 97110 THERAPEUTIC EXERCISES: CPT

## 2021-11-09 PROCEDURE — 97530 THERAPEUTIC ACTIVITIES: CPT

## 2021-11-09 NOTE — FLOWSHEET NOTE
[facility]  [address], [facility] [place] [address]  Phone: [phone]   Fax: [phone]    Occupational Therapy Treatment Note:     Date:  2021    Patient Name:  Amandeep Christensen    :  1965  MRN: [id number]      Medical/Treatment Diagnosis Information:  I63.9 (ICD-10-CM) Cerebral infarction, unspecified  Impaired R hand strength and ROM, decreased ADL status       Insurance/Certification information: None  Physician Information:  Dr. Zaida Yuen MD  Plan of care signed:    No    Date of Patient follow up with Physician: Unknown     Progress Report: []  Yes  [x]  No      Date Range for reporting period:  Beginnin/3/2021  Ending:      Progress report due:      Recertification due (POC duration/ or 90 days whichever is less): 2022     Visit # Insurance Allowable Auth Needed   3 Private Pay No     Latex Allergy:  [x]NO      []YES  Preferred Language for Healthcare:   [x]English       []other:  Functional Scale: UEFS (2021)  -- 33.75/100    Pain level: 0/10     SUBJECTIVE: Pt reports \"weird\" feeling in R hand, but no c/o of pain and/or unable to describe the feeling in his R hand. OBJECTIVE:    Observation: Presented to therapy session this date w/ R hand in fisted position.  Test measurements:      RESTRICTIONS/PRECAUTIONS: EF 10-15%, Aspirin    Therapeutic Activities:    Activity #1: Pt worked on functional grasping of R hand through picking up and placing golf balls on large pegs. With 6 large pegs in pegboard, pt instructed to grasp and place golf balls on top of pegs one at a time. Pt then instructed to remove golf balls from pegs one at a time. Pt completed 2 sets x6 golf balls w/ increased time and demo'd difficulty w/ aligning and placing golf balls on pegs compared to removing golf balls from pegs w/ several instances of dropping the golf ball.       Activity #2: Pt worked on functional grasping of R hand through picking up various containers and reaching above shoulder height to hand to OTS. Pt completed 3 trials x5 containers w/ increased time and requiring min v/c's to extend fingers in order to release container and for maintaining upright posture to inhibit leaning to the left. Activity #3: Pt instructed to unscrew and remove the lid off a container to work on functional grasp of R hand. Pt completed 3 trials w/ compensations noted in the shoulder to provide motion to unscrew lid and reduced demo of radial/ulnar deviation in order to twist lid off of container. Pt demo'd increased difficulty extending fingers for grasping container lid. Activity #4: Pt worked on fine motor control and grasping of R hand through Ft. meli blocks. Pt instructed to  Jenga pieces one at a time and then place the Ft. meli block on its side. Pt completed 4/9 blocks w/ increased time and min v/c's to incorporate use of digits 3-5 to assist with stabilization and rotation of the block onto its side due to noted compensations of using the table for assistance. Patient Education:  --Provided and educated patient with home exercise program, reviewing frequency, duration, and how to perform exercises - verb understanding   -- Discussed incorporating use of R hand in functional activities at home, such as using it to open doors, open containers, etc. - verb understanding     Therapeutic Exercises: Exercises in bold performed in department today. Items not bolded are carried forward from prior visits for continuity of the record. Exercise/Equipment Resistance/Repetitions HEP Other comments     Finger Abduction/Adduction 2 sets x8 reps [] Min A for abduction and CGA for adduction   Digit 2-5 MCP Flexion/Extension 2 sets x8 reps [] SBA w/ min v/c's to facilitate full extension. Increased AROM to fully extend compared to previous session.     Digit 2-5 DIP flexion/extension 2 sets x8 reps  SBA following initial instruction. Demo'd improved active extension this date. Digit 2-5 PIP Flexion/extension 2 sets x8 reps [] SBA following initial instruction. Demo'd improved active extension this date. Thumb Radial Abduction/Adduction 2 sets x8 reps [] Mod A for abduction and Min A for adduction. Thumb IP Flexion/Extension 2 sets x8 reps [] Mod A for extension and Min A for flexion. Thumb Flexion/Extension 2 sets x8 reps [] Min A for flexion and Mod A for extension. Home Exercise Program:  Seated Finger Composite Flexion Extension - 1 x daily - 7 x weekly - 2 sets - 10 reps  Thumb Abduction AROM on Table - 1 x daily - 7 x weekly - 2 sets - 10 reps  Thumb Radial Adduction with Thumb Flexion AROM on Table - 1 x daily - 7 x weekly - 2 sets - 10 reps  Finger Spreading - 1 x daily - 7 x weekly - 2 sets - 10 reps    Therapeutic Exercise:   [x] (40997) Provided verbal/tactile cueing for activities related to strengthening, flexibility, endurance, ROM. Includes review/progression of HEP activities related to strengthening, flexibility, endurance, AROM, proximal shoulder and UE for functional transfers/mobility, self-care, IADLs, and community re-entry    Therapeutic Activities:    [x] (76174) Provided verbal/tactile cueing for activities related to improving balance, coordination, kinesthetic sense, posture, motor skill, proprioception and motor activation to allow for proper function of core, proximal shoulder and UE with self-care, and ADLs, IADLs, functional mobility, work-related and leisure based activities.  Includes review/progression of HEP activities to improve balance, coordination, kinesthetic sense, posture, motor skill, proprioception, proximal shoulder and UE for functional transfers/mobility, self-care, IADLs, and community re-entry    Sensory Integration Techniques:  [] (22466)Provided verbal/tactile feedback to enhance sensory processing and promoting responses that are adaptive to environmental of right hand. []? Progressing: []? Met: []? Not Met: []? Adjusted   3. Pt will improve score on BBT to 50blocks/minute to demonstrate improved timing, speed and accuracy with functional grasp of right hand. []? Progressing: []? Met: []? Not Met: []? Adjusted  4. Pt will improve time on NHPT to no more than 2 minutes to demonstrate improved fine motor coordination of R hand. []? Progressing: []? Met: []? Not Met: []? Adjusted  5. Pt will write the letters of the alphabet with a standard pen with 85% legibility for improved handwriting with R hand. []? Progressing: []? Met: []? Not Met: []? Adjusted   6. Pt will be Independent with HEP/Home activities program to facilitate independence with carryover from sessions and to progress towards returning to PLOF  []? Progressing: []? Met: []? Not Met: []? Adjusted    ASSESSMENT:    Pt progressing well with therapeutic exercises as evidenced by increased activation of the wrist and finger extensor muscles for improved hand opening and functional grasp. Pt cont to require increased time for completion of fine motor coordination and grasping activities secondary to impaired digit dexterity of R hand. Pt benefits from repetitive task practice to relearn normal movement patterns and to improve timing, speed, and coordination of R hand for improved functional use in everyday activities. Pt cont to benefit from skilled OT services - cont per POC. Treatment/Activity Tolerance:  [x] Patient tolerated treatment well [] Patient limited by fatique  [] Patient limited by pain  [] Patient limited by other medical complications  [] Other:     Overall Progression Towards Functional goals/ Treatment Progress Update:  [] Patient is progressing as expected towards functional goals listed. [] Progression is slowed due to complexities/Impairments listed. [] Progression has been slowed due to co-morbidities.   [x] Plan just implemented, too soon to assess goals progression <30days [] Goals require adjustment due to lack of progress  [] Patient is not progressing as expected and requires additional follow up with physician  [] Other    Prognosis for POC: [x] Good [] Fair  [] Poor    Patient requires continued skilled intervention: [x] Yes  [] No        PLAN:   [x] Continue per plan of care [] Alter current plan (see comments)  [] Plan of care initiated [] Hold pending MD visit [] Discharge    Electronically signed by: BAL Hodge    Note: If patient does not return for scheduled/recommended follow up visits, this note will serve as a discharge from care along with the most recent update on progress.

## 2021-11-10 ENCOUNTER — APPOINTMENT (OUTPATIENT)
Dept: OCCUPATIONAL THERAPY | Age: 56
End: 2021-11-10
Payer: MEDICAID

## 2021-11-11 ENCOUNTER — HOSPITAL ENCOUNTER (OUTPATIENT)
Dept: OCCUPATIONAL THERAPY | Age: 56
Setting detail: THERAPIES SERIES
Discharge: HOME | End: 2021-11-11
Payer: MEDICAID

## 2021-11-11 NOTE — CARE COORDINATION
[facility] Outpatient Therapy  [address] [place] [address] [address]  Phone: [phone]   Fax: [phone]    Occupational Therapy Missed Visit Note     Date:  2021    Patient Name:  Jody Cabrera      :  1965    MRN: [id number]      Cancelled visits to date: 1 (2021)  No-shows to date: 0    For today's appointment patient:  [x]  Cancelled  []  Rescheduled appointment  []  No-show     Reason given by patient:  [x]  Patient ill  []  Conflicting appointment  []  No transportation    []  Conflict with work  []  No reason given  []  Other:     Comments:      Electronically signed by:   Atul Meadows OT, OT

## 2021-11-15 ENCOUNTER — HOSPITAL ENCOUNTER (OUTPATIENT)
Dept: OCCUPATIONAL THERAPY | Age: 56
Setting detail: THERAPIES SERIES
Discharge: HOME | End: 2021-11-15
Payer: MEDICAID

## 2021-11-15 PROCEDURE — 97110 THERAPEUTIC EXERCISES: CPT

## 2021-11-15 PROCEDURE — 97530 THERAPEUTIC ACTIVITIES: CPT

## 2021-11-15 NOTE — FLOWSHEET NOTE
[facility] Outpatient [facility]  [address] [place] [address], [address], [address]  Phone: [phone]   Fax: [phone]    Occupational Therapy Treatment Note:     Date:  11/15/2021    Patient Name:  Rudy Cabrera    :  1965  MRN: [id number]      Medical/Treatment Diagnosis Information:  I63.9 (ICD-10-CM) Cerebral infarction, unspecified  Impaired R hand strength and ROM, decreased ADL status       Insurance/Certification information: None  Physician Information:  Dr. Valentín Chaparro MD  Plan of care signed:    Yes    Date of Patient follow up with Physician: Unknown     Progress Report: []  Yes  [x]  No      Date Range for reporting period:  Beginnin/3/2021  Ending:      Progress report due:      Recertification due (POC duration/ or 90 days whichever is less): 2022     Visit # Insurance Allowable Auth Needed   4 Private Pay No     Latex Allergy:  [x]NO      []YES  Preferred Language for Healthcare:   [x]English       []other:  Functional Scale: UEFS (11/15/2021)  -- 33.75/100    Pain level: 0/10     SUBJECTIVE:  No new issues/concerns. OBJECTIVE:    Observation: Left postural leaning noted during TAs this date.  Test measurements:      RESTRICTIONS/PRECAUTIONS: EF 10-15%, Aspirin    Therapeutic Activities:    Activity #1: Pt worked on functional grasping of R hand through grasping of containers. Pt instructed to grasp and release various containers w/ application of electrical stimulation to the wrist and digit extensor musculature to promote finger extension for grasping. Pt completed 3 containers x8 reps each. Pt then instructed to grasp and release 3 containers x8 reps each without application of electrical stimulation. Pt required increased time, but demo'd increased extension of R hand during grasp and release compared to previous sessions. Activity #2: Pt worked on fine motor control of R hand through stacking large cubes.  Pt flexion/extension 2 sets x8 reps [x] SBA with improved extension noted compared to previous sessions. Improved AROM composite flexion/ extension noted. Digit 2-5 PIP Flexion/extension 2 sets x8 reps [x] SBA with improved extension noted compared to previous sessions. Improved AROM composite flexion/extension noted. Thumb Radial Abduction/Adduction 2 sets x8 reps [x] Min A for abduction, SBA for adduction. Thumb IP Flexion/Extension 2 sets x8 reps [x] SBA for flexion and Min A for extension. Thumb Flexion/Extension 2 sets x8 reps [x] SBA w/ improved AROM compared to previous sessions      Home Exercise Program:  Seated Finger Composite Flexion Extension - 1 x daily - 7 x weekly - 2 sets - 10 reps  Thumb Abduction AROM on Table - 1 x daily - 7 x weekly - 2 sets - 10 reps  Thumb Radial Adduction with Thumb Flexion AROM on Table - 1 x daily - 7 x weekly - 2 sets - 10 reps  Finger Spreading - 1 x daily - 7 x weekly - 2 sets - 10 reps  Thumb Flexion/Extension - 1x daily - 7x weekly - 2 sets - 10 reps  Thumb IP Flexion/Extension - 1x daily - 7x weekly - 2 sets - 10 reps    Therapeutic Exercise:   [x] (65180) Provided verbal/tactile cueing for activities related to strengthening, flexibility, endurance, ROM. Includes review/progression of HEP activities related to strengthening, flexibility, endurance, AROM, proximal shoulder and UE for functional transfers/mobility, self-care, IADLs, and community re-entry    Therapeutic Activities:    [x] (51679) Provided verbal/tactile cueing for activities related to improving balance, coordination, kinesthetic sense, posture, motor skill, proprioception and motor activation to allow for proper function of core, proximal shoulder and UE with self-care, and ADLs, IADLs, functional mobility, work-related and leisure based activities.  Includes review/progression of HEP activities to improve balance, coordination, kinesthetic sense, posture, motor skill, proprioception, proximal shoulder and UE for functional transfers/mobility, self-care, IADLs, and community re-entry    Sensory Integration Techniques:  [] (58716)Provided verbal/tactile feedback to enhance sensory processing and promoting responses that are adaptive to environmental demands    Self-Care/Home Management Training:  [] (49879) Provided training and education in restorative and compensatory strategies/activity modifications in activities of daily living, meal preparation, laundry and other various house maintenance activities. Provided training and education in use of adaptive equipment/devices and assistive technology, as needed, to promote participation and independence. Cognitive Function:  [] (83960 x15 minutes, 54008 +15 minutes) Integrated activities that address attention, memory, reasoning, executive functioning, problem solving, and/or pragmatic functioning in addition to compensatory strategies to manage the performance of an activity (for example, managing time or schedules, initiating, organizing, and sequencing tasks). NMR:  [] (83072) During functional activities/therapeutic exercises, provided facilitation of normal movement patterns and provided handling/verbal cues to promote postural alignment and stability during static and dynamic movement (sitting or standing). Activities may also include visual perceptual training, proprioception training, and balance retraining during functional activities    Manual Treatments:    [] (81584) Provided manual therapy to mobilize UB for the purpose of modulating pain, promoting relaxation,  increasing ROM, reducing/eliminating soft tissue swelling/inflammation/restriction, improving soft tissue extensibility and allowing for proper ROM for normal function with self-care, functional mobility, transfers, reaching and lifting    Modalities:     [] Electrical Stimulation (10471): Used during TE and TA this date.      [] Ultrasound (99278):    Charges:  Timed Code Treatment Minutes: 53   Total Treatment Minutes: 53      [] EVAL (LOW) 55506 (typically 20 minutes face-to-face)  [] EVAL (MOD) 81017 (typically 30 minutes face-to-face)  [] EVAL (HIGH) 28635 (typically 45 minutes face-to-face)  [] RE-EVAL     [x] VS(08197) x23 (2)      [] NMR (14443) x       [] Manual (53248) x       [x] TA (16090) x30 (2)       [] ES(attended) (57155)       [] ES (un) (78182)  [] Other:    GOALS:   Patient stated goal: To increase functional use of R hand. []? Progressing: []? Met: []? Not Met: []? Adjusted     Therapist goals for Patient: To increase functional use of R hand for improved functional independence in completion of everyday activities and for return to work and leisure activities.      Short Term Goals: To be achieved in: 6 weeks  1. Pt will improve score on UEFS to 70/100 for a subjective report of decreased difficulty with completing every day activities with RUE  []? Progressing: []? Met: []? Not Met: []? Adjusted  2. Pt will improve  strength to 30# to demonstrate improved functional grasp and container management of right hand. []? Progressing: []? Met: []? Not Met: []? Adjusted  3. Pt will improve score on BBT to 25 blocks/minute to demonstrate improved timing, speed and accuracy with functional grasp of right hand. []? Progressing: []? Met: []? Not Met: []? Adjusted  4. Pt will participate in NHPT to demonstrate improved fine motor coordination of R hand. []? Progressing: []? Met: []? Not Met: []? Adjusted  5. Pt will write the letters of the alphabet with a built-up pen with 70% legibility for improved handwriting with R hand. []? Progressing: []? Met: []? Not Met: []? Adjusted   6. Pt will be Supervision with HEP/Home activities program to facilitate independence with carryover from sessions and to progress towards returning to PLOF  []? Progressing: []? Met: []? Not Met: []? Adjusted    Long Term Goals:  To be achieved in: 12 weeks  1. Pt will improve score on UEFS to 85/100 for a subjective report of decreased difficulty with completing every day activities with RUE  []? Progressing: []? Met: []? Not Met: []? Adjusted  2. Pt will improve  strength to 60# to demonstrate improved functional grasp and container management of right hand. []? Progressing: []? Met: []? Not Met: []? Adjusted   3. Pt will improve score on BBT to 50blocks/minute to demonstrate improved timing, speed and accuracy with functional grasp of right hand. []? Progressing: []? Met: []? Not Met: []? Adjusted  4. Pt will improve time on NHPT to no more than 2 minutes to demonstrate improved fine motor coordination of R hand. []? Progressing: []? Met: []? Not Met: []? Adjusted  5. Pt will write the letters of the alphabet with a standard pen with 85% legibility for improved handwriting with R hand. []? Progressing: []? Met: []? Not Met: []? Adjusted   6. Pt will be Independent with HEP/Home activities program to facilitate independence with carryover from sessions and to progress towards returning to PLOF  []? Progressing: []? Met: []? Not Met: []? Adjusted    ASSESSMENT:     Pt progressing well with therapeutic exercises as evidenced by increased AROM of R hand for improved composite flexion/extension. Pt tolerated application of electrical stimulation during therapeutic activities demo'ing improved finger extension for increased functional grasping of R hand. Pt cont to require increased time and difficulty with completion of fine motor coordination tasks d/t impaired digit dexterity of R hand. Pt benefits from repetitive task practice to relearn normal movement patterns and for improved timing, speed, and coordination of RUE. Pt cont to benefit from skilled OT services - cont per POC.      Treatment/Activity Tolerance:  [x] Patient tolerated treatment well [] Patient limited by fatique  [] Patient limited by pain  [] Patient limited by other medical complications  [] Other:     Overall Progression Towards Functional goals/ Treatment Progress Update:  [] Patient is progressing as expected towards functional goals listed. [] Progression is slowed due to complexities/Impairments listed. [] Progression has been slowed due to co-morbidities. [x] Plan just implemented, too soon to assess goals progression <30days   [] Goals require adjustment due to lack of progress  [] Patient is not progressing as expected and requires additional follow up with physician  [] Other    Prognosis for POC: [x] Good [] Fair  [] Poor    Patient requires continued skilled intervention: [x] Yes  [] No        PLAN:   [x] Continue per plan of care [] Alter current plan (see comments)  [] Plan of care initiated [] Hold pending MD visit [] Discharge    Electronically signed by: BAL Olivier    Note: If patient does not return for scheduled/recommended follow up visits, this note will serve as a discharge from care along with the most recent update on progress.

## 2021-11-17 ENCOUNTER — HOSPITAL ENCOUNTER (OUTPATIENT)
Dept: OCCUPATIONAL THERAPY | Age: 56
Setting detail: THERAPIES SERIES
Discharge: HOME | End: 2021-11-17
Payer: MEDICAID

## 2021-11-17 PROCEDURE — 97530 THERAPEUTIC ACTIVITIES: CPT

## 2021-11-17 PROCEDURE — 97110 THERAPEUTIC EXERCISES: CPT

## 2021-11-17 NOTE — FLOWSHEET NOTE
[facility] Outpatient Therapy  [address] [place] [address] [address], [address]  Phone: [phone]   Fax: [phone]    Occupational Therapy Treatment Note:     Date:  2021    Patient Name:  Isaura Siddiqui    :  1965  MRN: [id number]      Medical/Treatment Diagnosis Information:  I63.9 (ICD-10-CM) Cerebral infarction, unspecified  Impaired R hand strength and ROM, decreased ADL status       Insurance/Certification information: None  Physician Information:  Dr. Jessica Lloyd MD  Plan of care signed:    Yes    Date of Patient follow up with Physician: Unknown     Progress Report: []  Yes  [x]  No      Date Range for reporting period:  Beginnin/3/2021  Ending:      Progress report due:      Recertification due (POC duration/ or 90 days whichever is less): 2022     Visit # Insurance Allowable Auth Needed    Private Pay No     Latex Allergy:  [x]NO      []YES  Preferred Language for Healthcare:   [x]English       []other:  Functional Scale: UEFS (2021)  -- 33.75/100    Pain level: 0/10    SUBJECTIVE: Pt reports noticing increased baseline resting hand/finger extension position      OBJECTIVE:    Observation: Left postural leaning.  Test measurements:      RESTRICTIONS/PRECAUTIONS: EF 10-15%, Aspirin    Therapeutic Activities:    Activity #1: Pt worked on functional grasping of R hand through cone stacking task. Pt instructed to  cones one at a time to form into pyramids. Pt completed 5 sets x3 cones and 3 sets x5 cones w/ increased time and min v/c to maintain upright posture. Activity #2: Pt worked on fine motor coordination and grasping of R hand through Ft. meli blocks. Pt instructed to  Ft. meli blocks one at a time oriented horizontally and place vertically on table. Pt completed 3 sets x4 blocks w/ increased time. Pt demo'd improvement of performance w/ 2nd and 3rd set w/ decreased incidents of dropping blocks. NMR  While seated edge of mat, pt engaged in weightbearing task through RUE to promote wrist and digit strength and stretch due to c/o fatigue and weakness in R wrist and hand. Pt instructed to reach outside ROSA M w/ LUE to grab cones and place on contralateral side of mat. Pt completed 3 sets x6 cones. Patient Education:  --Recommended pt acquire PCP for follow-up care s/p stroke. Educated pt on being at greater risk for another stroke d/t previous medical history of CVA, DM and CHF - verb understanding      Therapeutic Exercises: Exercises in bold performed in department today. Items not bolded are carried forward from prior visits for continuity of the record. Exercise/Equipment Resistance/Repetitions HEP Other comments     Finger Abduction/Adduction 2 sets x10 reps [x] SBA for abduction and Min A for adduction. Digit 2-5 MCP Flexion/Extension 2 sets x10 reps [x] SBA w/ improved extension compared to previous sessions. Digit 2-5 DIP flexion/extension 2 sets x8 reps [x] SBA w/ improved extension compared to previous sessions. Required Min A for facilitating full extension of digit 2. Digit 2-5 PIP Flexion/extension 2 sets x8 reps [x] SBA w/ improved extension compared to previous sessions. Required Min A for facilitating full extension of digit 2. Thumb Radial Abduction/Adduction 2 sets x8 reps [x] SBA w/ Min A for inhibiting IP flexion. Thumb IP Flexion/Extension 2 sets x8 reps [x] Required Min A for facilitating full extension. Thumb Flexion/Extension 2 sets x8 reps [x] Min A for facilitating full extension. Digiflex 3# 2 sets x10 reps  Required Min A for hand placement.       Home Exercise Program:  Seated Finger Composite Flexion Extension - 1 x daily - 7 x weekly - 2 sets - 10 reps  Thumb Abduction AROM on Table - 1 x daily - 7 x weekly - 2 sets - 10 reps  Thumb Radial Adduction with Thumb Flexion AROM on Table - 1 x daily - 7 x weekly - 2 sets - 10 reps  Finger Spreading - 1 x daily - 7 x weekly - 2 sets - 10 reps  Thumb Flexion/Extension - 1x daily - 7x weekly - 2 sets - 10 reps  Thumb IP Flexion/Extension - 1x daily - 7x weekly - 2 sets - 10 reps    Therapeutic Exercise:   [x] (77536) Provided verbal/tactile cueing for activities related to strengthening, flexibility, endurance, ROM. Includes review/progression of HEP activities related to strengthening, flexibility, endurance, AROM, proximal shoulder and UE for functional transfers/mobility, self-care, IADLs, and community re-entry    Therapeutic Activities:    [x] (96365) Provided verbal/tactile cueing for activities related to improving balance, coordination, kinesthetic sense, posture, motor skill, proprioception and motor activation to allow for proper function of core, proximal shoulder and UE with self-care, and ADLs, IADLs, functional mobility, work-related and leisure based activities. Includes review/progression of HEP activities to improve balance, coordination, kinesthetic sense, posture, motor skill, proprioception, proximal shoulder and UE for functional transfers/mobility, self-care, IADLs, and community re-entry    Sensory Integration Techniques:  [] (13471)Provided verbal/tactile feedback to enhance sensory processing and promoting responses that are adaptive to environmental demands    Self-Care/Home Management Training:  [] (78042) Provided training and education in restorative and compensatory strategies/activity modifications in activities of daily living, meal preparation, laundry and other various house maintenance activities. Provided training and education in use of adaptive equipment/devices and assistive technology, as needed, to promote participation and independence.     Cognitive Function:  [] (10605 x15 minutes, 85714 +15 minutes) Integrated activities that address attention, memory, reasoning, executive functioning, problem solving, and/or pragmatic functioning in addition to compensatory strategies to manage the performance of an activity (for example, managing time or schedules, initiating, organizing, and sequencing tasks). NMR:  [] (10836) During functional activities/therapeutic exercises, provided facilitation of normal movement patterns and provided handling/verbal cues to promote postural alignment and stability during static and dynamic movement (sitting or standing). Activities may also include visual perceptual training, proprioception training, and balance retraining during functional activities    Manual Treatments:    [] (58630) Provided manual therapy to mobilize UB for the purpose of modulating pain, promoting relaxation,  increasing ROM, reducing/eliminating soft tissue swelling/inflammation/restriction, improving soft tissue extensibility and allowing for proper ROM for normal function with self-care, functional mobility, transfers, reaching and lifting    Modalities:     [] Electrical Stimulation (56250): Used during TE and TA this date. [] Ultrasound (40787):    Charges:  Timed Code Treatment Minutes: 53   Total Treatment Minutes: 53      [] EVAL (LOW) 96692 (typically 20 minutes face-to-face)  [] EVAL (MOD) 96296 (typically 30 minutes face-to-face)  [] EVAL (HIGH) 59928 (typically 45 minutes face-to-face)  [] RE-EVAL     [x] UZ(00683) x15 (1)      [] NMR (94570) x       [] Manual (89569) x       [x] TA (09717) x38 (3)       [] ES(attended) (52679)       [] ES (un) (22937)  [] Other:    GOALS:   Patient stated goal: To increase functional use of R hand. []? Progressing: []? Met: []? Not Met: []? Adjusted     Therapist goals for Patient: To increase functional use of R hand for improved functional independence in completion of everyday activities and for return to work and leisure activities.      Short Term Goals: To be achieved in: 6 weeks  1.  Pt will improve score on UEFS to 70/100 for a subjective report of decreased difficulty with completing every day activities with with HEP/Home activities program to facilitate independence with carryover from sessions and to progress towards returning to PLOF  []? Progressing: []? Met: []? Not Met: []? Adjusted    ASSESSMENT:    Pt progressing well w/ therapeutic exercises as evidenced by increased AROM of R hand digits. Pt progressing well w/ functional grasping secondary to increased extension of the R hand. Pt cont to require increased time and demo difficulty with fine motor coordination tasks d/t impaired digit dexterity of R hand. Pt benefits from repetitive task practice to relearn normal movement patterns and to improve timing, speed, and coordination. Pt cont to benefit from skilled OT services - cont per POC. Treatment/Activity Tolerance:  [x] Patient tolerated treatment well [] Patient limited by fatique  [] Patient limited by pain  [] Patient limited by other medical complications  [] Other:     Overall Progression Towards Functional goals/ Treatment Progress Update:  [] Patient is progressing as expected towards functional goals listed. [] Progression is slowed due to complexities/Impairments listed. [] Progression has been slowed due to co-morbidities. [x] Plan just implemented, too soon to assess goals progression <30days   [] Goals require adjustment due to lack of progress  [] Patient is not progressing as expected and requires additional follow up with physician  [] Other    Prognosis for POC: [x] Good [] Fair  [] Poor    Patient requires continued skilled intervention: [x] Yes  [] No        PLAN:   [x] Continue per plan of care [] Alter current plan (see comments)  [] Plan of care initiated [] Hold pending MD visit [] Discharge    Electronically signed by: BAL Briseno    Note: If patient does not return for scheduled/recommended follow up visits, this note will serve as a discharge from care along with the most recent update on progress.

## 2021-11-18 ENCOUNTER — HOSPITAL ENCOUNTER (OUTPATIENT)
Dept: OCCUPATIONAL THERAPY | Age: 56
Setting detail: THERAPIES SERIES
Discharge: HOME | End: 2021-11-18
Payer: MEDICAID

## 2021-11-18 PROCEDURE — 97110 THERAPEUTIC EXERCISES: CPT

## 2021-11-18 PROCEDURE — 97530 THERAPEUTIC ACTIVITIES: CPT

## 2021-11-18 NOTE — FLOWSHEET NOTE
[facility] Outpatient [facility]  [address], [address], [address]  Phone: [phone]   Fax: [phone]    Occupational Therapy Treatment Note:     Date:  2021    Patient Name:  Anel Ibanez    :  1965  MRN: [id number]      Medical/Treatment Diagnosis Information:  I63.9 (ICD-10-CM) Cerebral infarction, unspecified  Impaired R hand strength and ROM, decreased ADL status       Insurance/Certification information: None  Physician Information:  Dr. Bharat Calhoun MD  Plan of care signed:    Yes    Date of Patient follow up with Physician: Unknown     Progress Report: []  Yes  [x]  No      Date Range for reporting period:  Beginnin/3/2021  Ending:      Progress report due: 5878     Recertification due (POC duration/ or 90 days whichever is less): 2022     Visit # Insurance Allowable Auth Needed    Private Pay No     Latex Allergy:  [x]NO      []YES  Preferred Language for Healthcare:   [x]English       []other:  Functional Scale: UEFS (2021)  -- 33.75/100    Pain level: 0/10    SUBJECTIVE:  No new issues/concerns. Pt requested decreasing visits in December to 2x/week rather than 3x/week d/t concerns of traffic during the holiday season. OBJECTIVE:    Observation: Posterior pelvic tilt and leaning to the L during TAs.  Test measurements:      RESTRICTIONS/PRECAUTIONS: EF 10-15%, Aspirin    Therapeutic Activities:    Activity #1: Pt worked on fine motor coordination of R hand through stacking large cubes from a visual model. Pt instructed to  cubes and arrange to match visual models. Pt completed 3 sets as follows: 1 set x7 block tower, 1 set x7 block pyramid, and 1 set x10 block staircase. Pt required increased time for completion of task and demo'd several incidents of dropping cubes. Pt required min v/c's to maintain upright posture and for orientation of blocks.      Activity #2: Pt worked on functional grasping w/ R hand through container management activity. Pt instructed to  containers of various sizes and place on top of overhead shelf. Pt then instructed to grasp containers to remove from overhead shelf. Pt required compensation by L hand to stabilize containers. Pt required increased time and dropped 3 containers during completion of task. Activity #3: Pt worked on digit dexterity of R hand through dealing a deck of cards. Pt instructed to hold deck of cards in L hand and deal using R hand as quickly as possible. Pt completed 1 set in 2 minutes demo'ing fatigue of R hand following task. NMR  While in quadruped, pt instructed to use L hand to play the card game \"war\" to facilitate weightbearing through RUE to increase wrist and digit musculature. Pt completed weightbearing task for the duration of 5 minutes. Patient Education:  --Educated pt on purpose of weightbearing task as a mean of increasing R wrist and hand strength and to provide stretch to extensor musculature of the RUE - verb understanding     Therapeutic Exercises: Exercises in bold performed in department today. Items not bolded are carried forward from prior visits for continuity of the record. Exercise/Equipment Resistance/Repetitions HEP Other comments     Finger Abduction/Adduction 2 sets x10 reps [x] Min A for adduction and CGA for abduction. Digit 2-5 MCP Flexion/Extension 2 sets x10 reps [x] SBA. Pt demo'ing improved composite flexion/extension of the R hand compared to previous sessions. Digit 2-5 DIP flexion/extension 2 sets x8 reps [x] SBA. Pt demo'ing improved composite flexion/extension of the R hand compared to previous sessions. Digit 2-5 PIP Flexion/extension 2 sets x8 reps [x] SBA. Pt demo'ing improved composite flexion/extension of the R hand compared to previous sessions. Thumb Radial Abduction/Adduction 2 sets x8 reps [x] Min A for adduction and required Min A for inhibiting IP flexors. Thumb IP Flexion/Extension 2 sets x8 reps [x] Required stabilization at the proximal phalanx. SBA for flexion and Min A for full extension. Thumb Flexion/Extension 2 sets x8 reps [x] SBA for flexion and Min A for facilitating full extension. Digiflex 3# 2 sets x10 reps []    R wrist and hand strengthening  15 reps  [] Pt instructed to spike large beach ball. Pt instructed to focus on maintaining strong wrist and hand. Pt demo'd wrist drop 4x during task. Home Exercise Program:  Seated Finger Composite Flexion Extension - 1 x daily - 7 x weekly - 2 sets - 10 reps  Thumb Abduction AROM on Table - 1 x daily - 7 x weekly - 2 sets - 10 reps  Thumb Radial Adduction with Thumb Flexion AROM on Table - 1 x daily - 7 x weekly - 2 sets - 10 reps  Finger Spreading - 1 x daily - 7 x weekly - 2 sets - 10 reps  Thumb Flexion/Extension - 1x daily - 7x weekly - 2 sets - 10 reps  Thumb IP Flexion/Extension - 1x daily - 7x weekly - 2 sets - 10 reps    Therapeutic Exercise:   [x] (71294) Provided verbal/tactile cueing for activities related to strengthening, flexibility, endurance, ROM. Includes review/progression of HEP activities related to strengthening, flexibility, endurance, AROM, proximal shoulder and UE for functional transfers/mobility, self-care, IADLs, and community re-entry    Therapeutic Activities:    [x] (77934) Provided verbal/tactile cueing for activities related to improving balance, coordination, kinesthetic sense, posture, motor skill, proprioception and motor activation to allow for proper function of core, proximal shoulder and UE with self-care, and ADLs, IADLs, functional mobility, work-related and leisure based activities.  Includes review/progression of HEP activities to improve balance, coordination, kinesthetic sense, posture, motor skill, proprioception, proximal shoulder and UE for functional transfers/mobility, self-care, IADLs, and community re-entry    Sensory Integration Techniques:  [] (typically 30 minutes face-to-face)  [] EVAL (HIGH) 40282 (typically 45 minutes face-to-face)  [] RE-EVAL     [x] GR(65094) x15 (1)      [] NMR (61303) x       [] Manual (13251) x       [x] TA (90507) x44 (3)       [] ES(attended) (34510)       [] ES (un) (79458)  [] Other:    GOALS:   Patient stated goal: To increase functional use of R hand. []? Progressing: []? Met: []? Not Met: []? Adjusted     Therapist goals for Patient: To increase functional use of R hand for improved functional independence in completion of everyday activities and for return to work and leisure activities.      Short Term Goals: To be achieved in: 6 weeks  1. Pt will improve score on UEFS to 70/100 for a subjective report of decreased difficulty with completing every day activities with RUE  []? Progressing: []? Met: []? Not Met: []? Adjusted  2. Pt will improve  strength to 30# to demonstrate improved functional grasp and container management of right hand. []? Progressing: []? Met: []? Not Met: []? Adjusted  3. Pt will improve score on BBT to 25 blocks/minute to demonstrate improved timing, speed and accuracy with functional grasp of right hand. []? Progressing: []? Met: []? Not Met: []? Adjusted  4. Pt will participate in NHPT to demonstrate improved fine motor coordination of R hand. []? Progressing: []? Met: []? Not Met: []? Adjusted  5. Pt will write the letters of the alphabet with a built-up pen with 70% legibility for improved handwriting with R hand. []? Progressing: []? Met: []? Not Met: []? Adjusted   6. Pt will be Supervision with HEP/Home activities program to facilitate independence with carryover from sessions and to progress towards returning to PLOF  []? Progressing: []? Met: []? Not Met: []? Adjusted    Long Term Goals: To be achieved in: 12 weeks  1. Pt will improve score on UEFS to 85/100 for a subjective report of decreased difficulty with completing every day activities with RUE  []? Progressing: []?  Met: []? Not Met: []? Adjusted  2. Pt will improve  strength to 60# to demonstrate improved functional grasp and container management of right hand. []? Progressing: []? Met: []? Not Met: []? Adjusted   3. Pt will improve score on BBT to 50blocks/minute to demonstrate improved timing, speed and accuracy with functional grasp of right hand. []? Progressing: []? Met: []? Not Met: []? Adjusted  4. Pt will improve time on NHPT to no more than 2 minutes to demonstrate improved fine motor coordination of R hand. []? Progressing: []? Met: []? Not Met: []? Adjusted  5. Pt will write the letters of the alphabet with a standard pen with 85% legibility for improved handwriting with R hand. []? Progressing: []? Met: []? Not Met: []? Adjusted   6. Pt will be Independent with HEP/Home activities program to facilitate independence with carryover from sessions and to progress towards returning to PLOF  []? Progressing: []? Met: []? Not Met: []? Adjusted    ASSESSMENT:    Pt progressing well with therapeutic exercises as evidenced by increased AROM of R hand digits. Pt demo'd improved mood this date as evidence by increased tolerance for challenging tasks and increased awareness of patience during recovery process. Pt cont to require increased time and demo difficulty w/ fine motor coordination tasks secondary to impaired digit dexterity and reduced  strength of R hand. Pt benefits from repetitive task practice to relearn normal movement patterns, to increase strength, and to improve timing, speed, and coordination of R hand. Pt cont to benefit from skilled OT services - cont per POC. Treatment/Activity Tolerance:  [x] Patient tolerated treatment well [] Patient limited by fatique  [] Patient limited by pain  [] Patient limited by other medical complications  [] Other:     Overall Progression Towards Functional goals/ Treatment Progress Update:  [] Patient is progressing as expected towards functional goals listed. [] Progression is slowed due to complexities/Impairments listed. [] Progression has been slowed due to co-morbidities. [x] Plan just implemented, too soon to assess goals progression <30days   [] Goals require adjustment due to lack of progress  [] Patient is not progressing as expected and requires additional follow up with physician  [] Other    Prognosis for POC: [x] Good [] Fair  [] Poor    Patient requires continued skilled intervention: [x] Yes  [] No        PLAN:   [x] Continue per plan of care [] Alter current plan (see comments)  [] Plan of care initiated [] Hold pending MD visit [] Discharge    Electronically signed by: BAL Adkins    Note: If patient does not return for scheduled/recommended follow up visits, this note will serve as a discharge from care along with the most recent update on progress.

## 2021-11-22 ENCOUNTER — HOSPITAL ENCOUNTER (OUTPATIENT)
Dept: OCCUPATIONAL THERAPY | Age: 56
Setting detail: THERAPIES SERIES
Discharge: HOME | End: 2021-11-22
Payer: MEDICAID

## 2021-11-22 PROCEDURE — 97530 THERAPEUTIC ACTIVITIES: CPT

## 2021-11-22 PROCEDURE — 97110 THERAPEUTIC EXERCISES: CPT

## 2021-11-22 NOTE — FLOWSHEET NOTE
[facility] Outpatient Therapy  [address] [place] [address], [address] [place] [address], [address] [place] [address] Ave  Phone: [phone]   Fax: [phone]    Occupational Therapy Treatment Note:     Date:  2021    Patient Name:  Karla Srinivasan    :  1965  MRN: [id number]      Medical/Treatment Diagnosis Information:  I63.9 (ICD-10-CM) Cerebral infarction, unspecified  Impaired R hand strength and ROM, decreased ADL status       Insurance/Certification information: None  Physician Information:  Dr. Guido Rangel MD  Plan of care signed:    Yes    Date of Patient follow up with Physician: Unknown     Progress Report: []  Yes  [x]  No      Date Range for reporting period:  Beginnin/3/2021  Ending:      Progress report due:      Recertification due (POC duration/ or 90 days whichever is less): 2022     Visit # Insurance Allowable Auth Needed    Private Pay No     Latex Allergy:  [x]NO      []YES  Preferred Language for Healthcare:   [x]English       []other:  Functional Scale: UEFS (2021)  -- 33.75/100    Pain level: 0/10    SUBJECTIVE:  Pt reports feeling stressed and frustrated by slow recovery process s/p CVA and d/t financial concerns (specifically worrying about finding a PCP but not having the financial means or insurance to cover visits). OBJECTIVE:    Observation: Postural leaning to the L during functional activities.  Test measurements:      RESTRICTIONS/PRECAUTIONS: EF 10-15%, Aspirin    Therapeutic Activities:    Activity #1: To work on functional grasping of R hand, pt instructed to grasp lemonade jug (filled with water) and pour water into 5 cups. Pt completed 1 set of task w/ increased time and demo'ing several incidents of readjusting R hand grasp on jug handle. Pt required min v/c's to inhibit postural leaning and demo'd minimal incidents of spilling water during task.      Activity #2: Pt worked on fine motor coordination of R hand through [address] [place] [address] large cubes. Pt instructed to  cubes one at a time and stack on top of each other. Pt completed 1 set x3 cubes; 1 set x5 cubes; and 1 set x7 cubes w/ increased time and demo'ing difficulty with using a neat pincer or 3-jaw ketan grasping pattern. Activity #3: Pt instructed to complete lace tying ADL board to simulate tying pant strings and shoelaces to work on fine motor coordination of R hand and bilateral coordination skills. Pt completed 1 set x5 knots w/ increased time and inability to tie knots tightly. Patient Education:  --Reiterated importance for pt to acquire a PCP to follow-up care s/p stroke. Reiterated to pt that he is at greater risk for a second stroke d/t previous medical history of recent CVA, DM, and CHF - verb understanding     Therapeutic Exercises: Exercises in bold performed in department today. Items not bolded are carried forward from prior visits for continuity of the record. Exercise/Equipment Resistance/Repetitions HEP Other comments     Finger Abduction/Adduction 2 sets x10 reps [x] Pt required SBA for abduction and Min A for adduction. Pt required v/c's for facilitating movement patterns. Digit 2-5 MCP Flexion/Extension 2 sets x10 reps [x] SBA following initial instruction. Digit 2-5 DIP flexion/extension 2 sets x10 reps [x] SBA following initial instruction. Digit 2-5 PIP Flexion/extension 2 sets x10 reps [x] SBA following initial instruction. Thumb Radial Abduction/Adduction 2 sets x10 reps [x] SBA for abduction/adduction. Pt required min A for inhibiting IP flexors and for facilitating movement patterns. Thumb IP Flexion/Extension 2 sets x10 reps [x] SBA for flexion and required Min A for full extension. Required v/c's for facilitating movement patterns. Thumb Flexion/Extension 2 sets x10 reps [x] SBA with min v/c's for facilitating movement patterns. Pt demo'd improved AROM compared to previous sessions.     Digiflex 3# 2 sets x10 reps [] R wrist and hand strengthening  15 reps  []      Home Exercise Program:  Seated Finger Composite Flexion Extension - 1 x daily - 7 x weekly - 2 sets - 10 reps  Thumb Abduction AROM on Table - 1 x daily - 7 x weekly - 2 sets - 10 reps  Thumb Radial Adduction with Thumb Flexion AROM on Table - 1 x daily - 7 x weekly - 2 sets - 10 reps  Finger Spreading - 1 x daily - 7 x weekly - 2 sets - 10 reps  Thumb Flexion/Extension - 1x daily - 7x weekly - 2 sets - 10 reps  Thumb IP Flexion/Extension - 1x daily - 7x weekly - 2 sets - 10 reps    Therapeutic Exercise:   [x] (15724) Provided verbal/tactile cueing for activities related to strengthening, flexibility, endurance, ROM. Includes review/progression of HEP activities related to strengthening, flexibility, endurance, AROM, proximal shoulder and UE for functional transfers/mobility, self-care, IADLs, and community re-entry    Therapeutic Activities:    [x] (62488) Provided verbal/tactile cueing for activities related to improving balance, coordination, kinesthetic sense, posture, motor skill, proprioception and motor activation to allow for proper function of core, proximal shoulder and UE with self-care, and ADLs, IADLs, functional mobility, work-related and leisure based activities. Includes review/progression of HEP activities to improve balance, coordination, kinesthetic sense, posture, motor skill, proprioception, proximal shoulder and UE for functional transfers/mobility, self-care, IADLs, and community re-entry    Sensory Integration Techniques:  [] (62815)Provided verbal/tactile feedback to enhance sensory processing and promoting responses that are adaptive to environmental demands    Self-Care/Home Management Training:  [] (73288) Provided training and education in restorative and compensatory strategies/activity modifications in activities of daily living, meal preparation, laundry and other various house maintenance activities.  Provided training and education in use of adaptive equipment/devices and assistive technology, as needed, to promote participation and independence. Cognitive Function:  [] (66221 x15 minutes, 53367 +15 minutes) Integrated activities that address attention, memory, reasoning, executive functioning, problem solving, and/or pragmatic functioning in addition to compensatory strategies to manage the performance of an activity (for example, managing time or schedules, initiating, organizing, and sequencing tasks). NMR:  [] (01608) During functional activities/therapeutic exercises, provided facilitation of normal movement patterns and provided handling/verbal cues to promote postural alignment and stability during static and dynamic movement (sitting or standing). Activities may also include visual perceptual training, proprioception training, and balance retraining during functional activities    Manual Treatments:    [] (50825) Provided manual therapy to mobilize UB for the purpose of modulating pain, promoting relaxation,  increasing ROM, reducing/eliminating soft tissue swelling/inflammation/restriction, improving soft tissue extensibility and allowing for proper ROM for normal function with self-care, functional mobility, transfers, reaching and lifting    Modalities:     [] Electrical Stimulation (47514): Used during TE and TA this date. [] Ultrasound (16968):    Charges:  Timed Code Treatment Minutes: 59   Total Treatment Minutes: 59      [] EVAL (LOW) 19469 (typically 20 minutes face-to-face)  [] EVAL (MOD) 80657 (typically 30 minutes face-to-face)  [] EVAL (HIGH) 73080 (typically 45 minutes face-to-face)  [] RE-EVAL     [x] JE(45149) x15 (1)      [] NMR (89929) x       [] Manual (42569) x       [x] TA (03184) x44 (3)       [] ES(attended) (27958)       [] ES (un) (36631)  [] Other:    GOALS:   Patient stated goal: To increase functional use of R hand. []? Progressing: []? Met: []? Not Met: []?  Adjusted     Therapist goals for Patient: To increase functional use of R hand for improved functional independence in completion of everyday activities and for return to work and leisure activities.      Short Term Goals: To be achieved in: 6 weeks  1. Pt will improve score on UEFS to 70/100 for a subjective report of decreased difficulty with completing every day activities with RUE  []? Progressing: []? Met: []? Not Met: []? Adjusted  2. Pt will improve  strength to 30# to demonstrate improved functional grasp and container management of right hand. []? Progressing: []? Met: []? Not Met: []? Adjusted  3. Pt will improve score on BBT to 25 blocks/minute to demonstrate improved timing, speed and accuracy with functional grasp of right hand. []? Progressing: []? Met: []? Not Met: []? Adjusted  4. Pt will participate in NHPT to demonstrate improved fine motor coordination of R hand. []? Progressing: []? Met: []? Not Met: []? Adjusted  5. Pt will write the letters of the alphabet with a built-up pen with 70% legibility for improved handwriting with R hand. []? Progressing: []? Met: []? Not Met: []? Adjusted   6. Pt will be Supervision with HEP/Home activities program to facilitate independence with carryover from sessions and to progress towards returning to PLOF  []? Progressing: []? Met: []? Not Met: []? Adjusted    Long Term Goals: To be achieved in: 12 weeks  1. Pt will improve score on UEFS to 85/100 for a subjective report of decreased difficulty with completing every day activities with RUE  []? Progressing: []? Met: []? Not Met: []? Adjusted  2. Pt will improve  strength to 60# to demonstrate improved functional grasp and container management of right hand. []? Progressing: []? Met: []? Not Met: []? Adjusted   3. Pt will improve score on BBT to 50blocks/minute to demonstrate improved timing, speed and accuracy with functional grasp of right hand. []? Progressing: []? Met: []? Not Met: []? Adjusted  4.  Pt will improve time on NHPT to no more than 2 minutes to demonstrate improved fine motor coordination of R hand. []? Progressing: []? Met: []? Not Met: []? Adjusted  5. Pt will write the letters of the alphabet with a standard pen with 85% legibility for improved handwriting with R hand. []? Progressing: []? Met: []? Not Met: []? Adjusted   6. Pt will be Independent with HEP/Home activities program to facilitate independence with carryover from sessions and to progress towards returning to PLOF  []? Progressing: []? Met: []? Not Met: []? Adjusted    ASSESSMENT:    Pt progressing well with therapeutic exercises as evidenced by reduced cueing required for facilitating normal movement patterns and improve AROM. Pt cont to require increased time for and demo difficulty with fine motor coordination activities d/t impaired digit dexterity and strength. Pt benefits from repetitive task practice to relearn normal movement patterns and improve timing, speed, and coordination of R hand. Pt cont to benefit from skilled OT services - cont per POC. Treatment/Activity Tolerance:  [x] Patient tolerated treatment well [] Patient limited by fatique  [] Patient limited by pain  [] Patient limited by other medical complications  [] Other:     Overall Progression Towards Functional goals/ Treatment Progress Update:  [] Patient is progressing as expected towards functional goals listed. [] Progression is slowed due to complexities/Impairments listed. [] Progression has been slowed due to co-morbidities.   [x] Plan just implemented, too soon to assess goals progression <30days   [] Goals require adjustment due to lack of progress  [] Patient is not progressing as expected and requires additional follow up with physician  [] Other    Prognosis for POC: [x] Good [] Fair  [] Poor    Patient requires continued skilled intervention: [x] Yes  [] No        PLAN:   [x] Continue per plan of care [] Alter current plan (see comments)  [] Plan of care

## 2021-11-23 ENCOUNTER — HOSPITAL ENCOUNTER (OUTPATIENT)
Dept: OCCUPATIONAL THERAPY | Age: 56
Setting detail: THERAPIES SERIES
Discharge: HOME | End: 2021-11-23
Payer: MEDICAID

## 2021-11-23 PROCEDURE — 97110 THERAPEUTIC EXERCISES: CPT

## 2021-11-23 PROCEDURE — 97530 THERAPEUTIC ACTIVITIES: CPT

## 2021-11-23 NOTE — FLOWSHEET NOTE
[facility] Outpatient Therapy  [address] [employer], [facility] [address] [place] [address]  Phone: [phone]   Fax: [phone]    Occupational Therapy Treatment Note:     Date:  2021    Patient Name:  Amandeep Christensen    :  1965  MRN: [id number]      Medical/Treatment Diagnosis Information:  I63.9 (ICD-10-CM) Cerebral infarction, unspecified  Impaired R hand strength and ROM, decreased ADL status       Insurance/Certification information: None  Physician Information:  Dr. Zaida Yuen MD  Plan of care signed:    Yes    Date of Patient follow up with Physician: Unknown     Progress Report: []  Yes  [x]  No      Date Range for reporting period:  Beginnin/3/2021  Ending:      Progress report due:      Recertification due (POC duration/ or 90 days whichever is less): 2022     Visit # Insurance Allowable Auth Needed    Private Pay No     Latex Allergy:  [x]NO      []YES  Preferred Language for Healthcare:   [x]English       []other:  Functional Scale: UEFS (2021)  -- 33.75/100    Pain level: 0/10    SUBJECTIVE:  Pt denied new issues/concerns. OBJECTIVE:    Observation: Postural leaning to L during functional activities.  Test measurements:      RESTRICTIONS/PRECAUTIONS: EF 10-15%, Aspirin    Therapeutic Activities:    Activity #1: Pt worked on fine motor coordination and pincer grasping skills of R hand through bead activity. Pt instructed to  medium-sized beads one at a time and drop into container. Pt completed 2 sets x10 beads w/ increased time and difficulty w/ using a pincer grasping pattern. Activity #2: Pt instructed to  a variety of containers/objects to simulate grocery shopping to work on functional grasping of R hand. Pt completed 3 sets (put items in grocery cart, put items on check-out tomas, and put items in trunk of car) x8 containers/items.  Pt required increased time for completion of task and demo'd increased difficulty w/ larger containers requiring increased composite flexion/extension of R hand. Activity #3: Pt worked on fine motor coordination and pincer grasping skills of R hand through [employer] game. Pt instructed to  game pieces one at a time and place into game board. Pt completed 5 sets x6 pieces w/ compensations of game pieces held up vertically d/t increased difficulty w/ picking up pieces from the table horizontally. NMR  Pt sustained weightbearing through R hand to facilitate RUE strengthening and stretch d/t c/o of R wrist and hand weakness and fatigue. Pt tolerated weightbearing task for the duration of 5 minutes in which he reached outside [employer] for cones using L hand to facilitate increased weightbearing through RUE. Patient Education:  --Educated pt on purpose of weightbearing through RUE to increase strength and stretch of the RUE and on incorporating weightbearing at home - verb understanding      Therapeutic Exercises: Exercises in bold performed in department today. Items not bolded are carried forward from prior visits for continuity of the record. Exercise/Equipment Resistance/Repetitions HEP Other comments     Finger Abduction/Adduction 2 sets x10 reps [x] Min A for adduction and SBA for abduction. Improved range w/ abduction compared to previous sessions. Digit 2-5 MCP Flexion/Extension 2 sets x10 reps [x]  SBA w/ v/c'ing to facilitate movement patterns. Digit 2-5 DIP flexion/extension 2 sets x10 reps [x] SBA w/ v/c'ing to facilitate movement patterns. Digit 2-5 PIP Flexion/extension 2 sets x10 reps [x] SBA w/ v/c'ing to facilitate movement patterns. Thumb Radial Abduction/Adduction 2 sets x10 reps [x] SBA for abduction and Min A for facilitating full adduction. Required Min A for inhibiting IP flexors. Thumb IP Flexion/Extension 2 sets x10 reps [x] SBA for flexion and required Min A for extension.       Thumb Flexion/Extension 2 sets x10 reps [x] SBA w/ v/c'ing to facilitate movement patterns. Digiflex 3# 2 sets x10 reps []    R wrist and hand strengthening  15 reps  []      Home Exercise Program:  Seated Finger Composite Flexion Extension - 1 x daily - 7 x weekly - 2 sets - 10 reps  Thumb Abduction AROM on Table - 1 x daily - 7 x weekly - 2 sets - 10 reps  Thumb Radial Adduction with Thumb Flexion AROM on Table - 1 x daily - 7 x weekly - 2 sets - 10 reps  Finger Spreading - 1 x daily - 7 x weekly - 2 sets - 10 reps  Thumb Flexion/Extension - 1x daily - 7x weekly - 2 sets - 10 reps  Thumb IP Flexion/Extension - 1x daily - 7x weekly - 2 sets - 10 reps    Therapeutic Exercise:   [x] (92234) Provided verbal/tactile cueing for activities related to strengthening, flexibility, endurance, ROM. Includes review/progression of HEP activities related to strengthening, flexibility, endurance, AROM, proximal shoulder and UE for functional transfers/mobility, self-care, IADLs, and community re-entry    Therapeutic Activities:    [x] (08343) Provided verbal/tactile cueing for activities related to improving balance, coordination, kinesthetic sense, posture, motor skill, proprioception and motor activation to allow for proper function of core, proximal shoulder and UE with self-care, and ADLs, IADLs, functional mobility, work-related and leisure based activities.  Includes review/progression of HEP activities to improve balance, coordination, kinesthetic sense, posture, motor skill, proprioception, proximal shoulder and UE for functional transfers/mobility, self-care, IADLs, and community re-entry    Sensory Integration Techniques:  [] (07153)Provided verbal/tactile feedback to enhance sensory processing and promoting responses that are adaptive to environmental demands    Self-Care/Home Management Training:  [] (97774) Provided training and education in restorative and compensatory strategies/activity modifications in activities of daily living, meal preparation, laundry and other various house maintenance activities. Provided training and education in use of adaptive equipment/devices and assistive technology, as needed, to promote participation and independence. Cognitive Function:  [] (50664 x15 minutes, 51560 +15 minutes) Integrated activities that address attention, memory, reasoning, executive functioning, problem solving, and/or pragmatic functioning in addition to compensatory strategies to manage the performance of an activity (for example, managing time or schedules, initiating, organizing, and sequencing tasks). NMR:  [] (52537) During functional activities/therapeutic exercises, provided facilitation of normal movement patterns and provided handling/verbal cues to promote postural alignment and stability during static and dynamic movement (sitting or standing). Activities may also include visual perceptual training, proprioception training, and balance retraining during functional activities    Manual Treatments:    [] (68406) Provided manual therapy to mobilize UB for the purpose of modulating pain, promoting relaxation,  increasing ROM, reducing/eliminating soft tissue swelling/inflammation/restriction, improving soft tissue extensibility and allowing for proper ROM for normal function with self-care, functional mobility, transfers, reaching and lifting    Modalities:     [] Electrical Stimulation (81519): Used during TE and TA this date.      [] Ultrasound (08180):    Charges:  Timed Code Treatment Minutes: 59   Total Treatment Minutes: 59      [] EVAL (LOW) 04360 (typically 20 minutes face-to-face)  [] EVAL (MOD) 37205 (typically 30 minutes face-to-face)  [] EVAL (HIGH) 75315 (typically 45 minutes face-to-face)  [] RE-EVAL     [x] (56824) x15 (1)      [] NMR (94463) x       [] Manual (95179) x       [x] TA (77502) x44 (3)       [] ES(attended) (74662)       [] ES (un) (74137)  [] Other:    GOALS:   Patient stated goal: To increase functional use of R hand. []? Progressing: []? Met: []? Not Met: []? Adjusted     Therapist goals for Patient: To increase functional use of R hand for improved functional independence in completion of everyday activities and for return to work and leisure activities.      Short Term Goals: To be achieved in: 6 weeks  1. Pt will improve score on UEFS to 70/100 for a subjective report of decreased difficulty with completing every day activities with RUE  []? Progressing: []? Met: []? Not Met: []? Adjusted  2. Pt will improve  strength to 30# to demonstrate improved functional grasp and container management of right hand. []? Progressing: []? Met: []? Not Met: []? Adjusted  3. Pt will improve score on BBT to 25 blocks/minute to demonstrate improved timing, speed and accuracy with functional grasp of right hand. []? Progressing: []? Met: []? Not Met: []? Adjusted  4. Pt will participate in NHPT to demonstrate improved fine motor coordination of R hand. []? Progressing: []? Met: []? Not Met: []? Adjusted  5. Pt will write the letters of the alphabet with a built-up pen with 70% legibility for improved handwriting with R hand. []? Progressing: []? Met: []? Not Met: []? Adjusted   6. Pt will be Supervision with HEP/Home activities program to facilitate independence with carryover from sessions and to progress towards returning to PLOF  []? Progressing: []? Met: []? Not Met: []? Adjusted    Long Term Goals: To be achieved in: 12 weeks  1. Pt will improve score on UEFS to 85/100 for a subjective report of decreased difficulty with completing every day activities with RUE  []? Progressing: []? Met: []? Not Met: []? Adjusted  2. Pt will improve  strength to 60# to demonstrate improved functional grasp and container management of right hand. []? Progressing: []? Met: []? Not Met: []? Adjusted   3.  Pt will improve score on BBT to 50blocks/minute to demonstrate improved timing, speed and accuracy with functional grasp of right hand. []? Progressing: []? Met: []? Not Met: []? Adjusted  4. Pt will improve time on NHPT to no more than 2 minutes to demonstrate improved fine motor coordination of R hand. []? Progressing: []? Met: []? Not Met: []? Adjusted  5. Pt will write the letters of the alphabet with a standard pen with 85% legibility for improved handwriting with R hand. []? Progressing: []? Met: []? Not Met: []? Adjusted   6. Pt will be Independent with HEP/Home activities program to facilitate independence with carryover from sessions and to progress towards returning to PLOF  []? Progressing: []? Met: []? Not Met: []? Adjusted    ASSESSMENT:    Pt progressing well w/ therapeutic exercises as evidenced by increased AROM w/ reduced cueing required for facilitating normal movement patterns. Pt cont to require increased time and demo difficulty w/ fine motor coordination and grasping tasks d/t impaired digit dexterity and reduced strength of R hand. Pt benefits from repetitive task practice to relearn normal movement patterns and for improving timing, speed, and coordination. Pt cont to benefit from skilled OT services - cont per POC. Treatment/Activity Tolerance:  [x] Patient tolerated treatment well [] Patient limited by fatique  [] Patient limited by pain  [] Patient limited by other medical complications  [] Other:     Overall Progression Towards Functional goals/ Treatment Progress Update:  [] Patient is progressing as expected towards functional goals listed. [] Progression is slowed due to complexities/Impairments listed. [] Progression has been slowed due to co-morbidities.   [x] Plan just implemented, too soon to assess goals progression <30days   [] Goals require adjustment due to lack of progress  [] Patient is not progressing as expected and requires additional follow up with physician  [] Other    Prognosis for POC: [x] Good [] Fair  [] Poor    Patient requires continued skilled intervention: [x] Yes  [] No        PLAN:   [x] Continue per plan of care [] Alter current plan (see comments)  [] Plan of care initiated [] Hold pending MD visit [] Discharge    Electronically signed by: BAL Graham    Note: If patient does not return for scheduled/recommended follow up visits, this note will serve as a discharge from care along with the most recent update on progress.

## 2021-11-24 ENCOUNTER — HOSPITAL ENCOUNTER (OUTPATIENT)
Dept: OCCUPATIONAL THERAPY | Age: 56
Setting detail: THERAPIES SERIES
Discharge: HOME | End: 2021-11-24
Payer: MEDICAID

## 2021-11-24 PROCEDURE — 97110 THERAPEUTIC EXERCISES: CPT

## 2021-11-24 PROCEDURE — 97530 THERAPEUTIC ACTIVITIES: CPT

## 2021-11-24 NOTE — FLOWSHEET NOTE
[facility] Outpatient Therapy  [address] [place] [address] [address], [address]  Phone: [phone]   Fax: [phone]    Occupational Therapy Treatment Note:     Date:  2021    Patient Name:  Bebeto Hickman    :  1965  MRN: [id number]      Medical/Treatment Diagnosis Information:  I63.9 (ICD-10-CM) Cerebral infarction, unspecified  Impaired R hand strength and ROM, decreased ADL status       Insurance/Certification information: None  Physician Information:  Dr. Rosendo Ashford MD  Plan of care signed:    Yes    Date of Patient follow up with Physician: Unknown     Progress Report: []  Yes  [x]  No      Date Range for reporting period:  Beginnin/3/2021  Ending:      Progress report due: 20/3/7742     Recertification due (POC duration/ or 90 days whichever is less): 2022     Visit # Insurance Allowable Auth Needed    Private Pay No     Latex Allergy:  [x]NO      []YES  Preferred Language for Healthcare:   [x]English       []other:  Functional Scale: UEFS (2021)  -- 33.75/100    Pain level: 0/10    SUBJECTIVE:  Pt denies new issues/concerns. OBJECTIVE:    Observation: Posterior pelvic tilt during functional activities.  Test measurements:      RESTRICTIONS/PRECAUTIONS: EF 10-15%, Aspirin    Therapeutic Activities:    Activity #1: Pt worked on fine motor pincer grasping skills of R hand through stacking blocks. Pt instructed to  medium-sized blocks one at a time and stack into a tower. Pt completed 2 sets: 1 set x5 blocks and 1 set x6 blocks. Pt required increased time for completion of task and was unable to  triangle blocks. Activity #2: Pt worked on functional grasping of R hand through stacking of cones. Pt instructed to  cones one at a time and stack into a pyramid. Pt completed 3 sets: 1 set x3 cones, 1 set x5 cones, and 1 set x7 cones.  Pt required increased time for completion of task and demo'd several incidents of dropping cones. Activity #3: Pt worked on fine motor coordination of R hand through a variety of \"Jenga\" block tasks. - Pt instructed to  blocks one at a time and stand up vertically or horizontally into a line. Pt completed 1 set for each orientation x6 blocks. Pt required increased time for completion of task. - Pt instructed to  blocks one at a time and build into 2-block structures. Pt completed 3 sets x2 blocks w/ increased time and several incidents of dropping blocks. NMR  Pt participated in weightbearing through RUE to increase wrist and hand strength/stretch. Pt tolerated weightbearing for the duration of 5 minutes in which weight was applied through RUE while L hand picked up cones outside [employer] [facility]. Patient Education:  -- Importance of patience throughout the recovery process - verb understanding     Therapeutic Exercises: Exercises in bold performed in department today. Items not bolded are carried forward from prior visits for continuity of the record. Exercise/Equipment Resistance/Repetitions HEP Other comments     Finger Abduction/Adduction 2 sets x10 reps [x] Min A for adduction and SBA for abduction. Digit 2-5 MCP Flexion/Extension 2 sets x10 reps [x] SBA   Digit 2-5 DIP flexion/extension 2 sets x10 reps [x] SBA   Digit 2-5 PIP Flexion/extension 2 sets x10 reps [x] SBA   Thumb Radial Abduction/Adduction 2 sets x10 reps [x] Min A for facilitating full adduction and Min A for inhibiting IP flexors. Thumb IP Flexion/Extension 2 sets x10 reps [x] SBA w/ improved active flexion and extension compared to previous sessions. Thumb Flexion/Extension 2 sets x10 reps [x] SBA w/ improved active flexion and extension compared to previous sessions   Digiflex 3# 2 sets x10 reps []    R wrist and hand strengthening  15 reps  [] Spiking large beach ball. Tolerated well w/ improved R hand composite extension compared to previous sessions.       Home Exercise Program:  Seated Finger Composite Flexion Extension - 1 x daily - 7 x weekly - 2 sets - 10 reps  Thumb Abduction AROM on Table - 1 x daily - 7 x weekly - 2 sets - 10 reps  Thumb Radial Adduction with Thumb Flexion AROM on Table - 1 x daily - 7 x weekly - 2 sets - 10 reps  Finger Spreading - 1 x daily - 7 x weekly - 2 sets - 10 reps  Thumb Flexion/Extension - 1x daily - 7x weekly - 2 sets - 10 reps  Thumb IP Flexion/Extension - 1x daily - 7x weekly - 2 sets - 10 reps    Therapeutic Exercise:   [x] (39880) Provided verbal/tactile cueing for activities related to strengthening, flexibility, endurance, ROM. Includes review/progression of HEP activities related to strengthening, flexibility, endurance, AROM, proximal shoulder and UE for functional transfers/mobility, self-care, IADLs, and community re-entry    Therapeutic Activities:    [x] (33245) Provided verbal/tactile cueing for activities related to improving balance, coordination, kinesthetic sense, posture, motor skill, proprioception and motor activation to allow for proper function of core, proximal shoulder and UE with self-care, and ADLs, IADLs, functional mobility, work-related and leisure based activities. Includes review/progression of HEP activities to improve balance, coordination, kinesthetic sense, posture, motor skill, proprioception, proximal shoulder and UE for functional transfers/mobility, self-care, IADLs, and community re-entry    Sensory Integration Techniques:  [] (05893)Provided verbal/tactile feedback to enhance sensory processing and promoting responses that are adaptive to environmental demands    Self-Care/Home Management Training:  [] (19567) Provided training and education in restorative and compensatory strategies/activity modifications in activities of daily living, meal preparation, laundry and other various house maintenance activities.  Provided training and education in use of adaptive equipment/devices and assistive technology, as needed, to promote participation and independence. Cognitive Function:  [] (25084 x15 minutes, 73970 +15 minutes) Integrated activities that address attention, memory, reasoning, executive functioning, problem solving, and/or pragmatic functioning in addition to compensatory strategies to manage the performance of an activity (for example, managing time or schedules, initiating, organizing, and sequencing tasks). NMR:  [] (41396) During functional activities/therapeutic exercises, provided facilitation of normal movement patterns and provided handling/verbal cues to promote postural alignment and stability during static and dynamic movement (sitting or standing). Activities may also include visual perceptual training, proprioception training, and balance retraining during functional activities    Manual Treatments:    [] (78056) Provided manual therapy to mobilize UB for the purpose of modulating pain, promoting relaxation,  increasing ROM, reducing/eliminating soft tissue swelling/inflammation/restriction, improving soft tissue extensibility and allowing for proper ROM for normal function with self-care, functional mobility, transfers, reaching and lifting    Modalities:     [] Electrical Stimulation (60062): Used during TE and TA this date. [] Ultrasound (69994):    Charges:  Timed Code Treatment Minutes: 53   Total Treatment Minutes: 53      [] EVAL (LOW) 65279 (typically 20 minutes face-to-face)  [] EVAL (MOD) 32842 (typically 30 minutes face-to-face)  [] EVAL (HIGH) 26506 (typically 45 minutes face-to-face)  [] RE-EVAL     [x] DG(72961) x23 (2)      [] NMR (79495) x       [] Manual (05304) x       [x] TA (69030) x30 (2)       [] ES(attended) (58809)       [] ES (un) (37332)  [] Other:    GOALS:   Patient stated goal: To increase functional use of R hand. []? Progressing: []? Met: []? Not Met: []? Adjusted     Therapist goals for Patient:  To increase functional use of R hand for improved functional independence in completion of everyday activities and for return to work and leisure activities.      Short Term Goals: To be achieved in: 6 weeks  1. Pt will improve score on UEFS to 70/100 for a subjective report of decreased difficulty with completing every day activities with RUE  []? Progressing: []? Met: []? Not Met: []? Adjusted  2. Pt will improve  strength to 30# to demonstrate improved functional grasp and container management of right hand. []? Progressing: []? Met: []? Not Met: []? Adjusted  3. Pt will improve score on BBT to 25 blocks/minute to demonstrate improved timing, speed and accuracy with functional grasp of right hand. []? Progressing: []? Met: []? Not Met: []? Adjusted  4. Pt will participate in NHPT to demonstrate improved fine motor coordination of R hand. []? Progressing: []? Met: []? Not Met: []? Adjusted  5. Pt will write the letters of the alphabet with a built-up pen with 70% legibility for improved handwriting with R hand. []? Progressing: []? Met: []? Not Met: []? Adjusted   6. Pt will be Supervision with HEP/Home activities program to facilitate independence with carryover from sessions and to progress towards returning to PLOF  []? Progressing: []? Met: []? Not Met: []? Adjusted    Long Term Goals: To be achieved in: 12 weeks  1. Pt will improve score on UEFS to 85/100 for a subjective report of decreased difficulty with completing every day activities with RUE  []? Progressing: []? Met: []? Not Met: []? Adjusted  2. Pt will improve  strength to 60# to demonstrate improved functional grasp and container management of right hand. []? Progressing: []? Met: []? Not Met: []? Adjusted   3. Pt will improve score on BBT to 50blocks/minute to demonstrate improved timing, speed and accuracy with functional grasp of right hand. []? Progressing: []? Met: []? Not Met: []? Adjusted  4.  Pt will improve time on NHPT to no more than 2 minutes to demonstrate improved fine motor coordination of R hand. []? Progressing: []? Met: []? Not Met: []? Adjusted  5. Pt will write the letters of the alphabet with a standard pen with 85% legibility for improved handwriting with R hand. []? Progressing: []? Met: []? Not Met: []? Adjusted   6. Pt will be Independent with HEP/Home activities program to facilitate independence with carryover from sessions and to progress towards returning to PLOF  []? Progressing: []? Met: []? Not Met: []? Adjusted    ASSESSMENT:    Pt progressing well w/ therapeutic exercises as evidenced by increased AROM achieved and reduced cueing required for facilitating normal movement patterns. Pt cont to require increased time and demo difficulty w/ fine motor coordination tasks d/t impaired digit dexterity and decreased strength of R hand. Pt benefits from repetitive task practice to relearn normal movement patterns and to improve timing, speed, and coordination of R hand. Pt cont to benefit from skilled OT services to improve functional use of RUE - cont per POC. Treatment/Activity Tolerance:  [x] Patient tolerated treatment well [] Patient limited by fatique  [] Patient limited by pain  [] Patient limited by other medical complications  [] Other:     Overall Progression Towards Functional goals/ Treatment Progress Update:  [] Patient is progressing as expected towards functional goals listed. [] Progression is slowed due to complexities/Impairments listed. [] Progression has been slowed due to co-morbidities.   [x] Plan just implemented, too soon to assess goals progression <30days   [] Goals require adjustment due to lack of progress  [] Patient is not progressing as expected and requires additional follow up with physician  [] Other    Prognosis for POC: [x] Good [] Fair  [] Poor    Patient requires continued skilled intervention: [x] Yes  [] No        PLAN:   [x] Continue per plan of care [] Alter current plan (see comments)  [] Plan of care initiated [] Hold pending MD visit [] Discharge    Electronically signed by: BAL Gonzalez    Note: If patient does not return for scheduled/recommended follow up visits, this note will serve as a discharge from care along with the most recent update on progress. 154.9

## 2021-11-29 ENCOUNTER — HOSPITAL ENCOUNTER (OUTPATIENT)
Dept: OCCUPATIONAL THERAPY | Age: 56
Setting detail: THERAPIES SERIES
Discharge: HOME | End: 2021-11-29
Payer: MEDICAID
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
## 2021-11-30 ENCOUNTER — HOSPITAL ENCOUNTER (OUTPATIENT)
Dept: OCCUPATIONAL THERAPY | Age: 56
Setting detail: THERAPIES SERIES
Discharge: HOME | End: 2021-11-30
Payer: MEDICAID

## 2021-11-30 PROCEDURE — 97110 THERAPEUTIC EXERCISES: CPT

## 2021-11-30 PROCEDURE — 97530 THERAPEUTIC ACTIVITIES: CPT

## 2021-11-30 NOTE — PROGRESS NOTES
[facility] Outpatient Therapy  [address] [place] [address], [address], [address]  Phone: [phone]   Fax: [phone]    Occupational Therapy Treatment Note/Progress Note:     Date:  2021    Patient Name:  Meribeth Schilder    :  1965  MRN: [id number]      Medical/Treatment Diagnosis Information:  I63.9 (ICD-10-CM) Cerebral infarction, unspecified  Impaired R hand strength and ROM, decreased ADL status       Insurance/Certification information: None  Physician Information:  Dr. Harsha Mon MD  Plan of care signed:    Yes    Date of Patient follow up with Physician: Unknown     Progress Report: [x]  Yes  []  No      Date Range for reporting period:  Beginnin/3/2021  Endin2021      Progress report due:     Recertification due (POC duration/ or 90 days whichever is less): 2022     Visit # Insurance Allowable Auth Needed   10/31 Private Pay No     Latex Allergy:  [x]NO      []YES  Preferred Language for Healthcare:   [x]English       []other:  Functional Scale: UEFS (2021)  -- 66.25/100    Pain level: 0/10     SUBJECTIVE: Pt denies new issues/concerns. OBJECTIVE:    Observation: Left postural leaning during functional activities this date.  Test measurements:       Assessment Evaluation (11/3/2021) Progress Note (2021)   UEFS 33.75/100 66.25/100    Strength 18#   39#   BBT 3 blocks 14 blocks    NHPT NT 2 pegs in board (after 3 minutes)    Handwriting NT Instructed to write first and last name w/ built-up pen. Difficulty grasping pen and wrote name w/ 42% legibility. HEP Initiated at second visit Pt reports completed home exercise program 2x/daily independently. RESTRICTIONS/PRECAUTIONS: EF 10-15%, Aspirin    Therapeutic Activities:    Activity #1: Pt worked on fine motor coordination of R hand and bilateral coordination through a variety of tasks with H?REL of [employer].     - Pt instructed to deal a deck of [facility] cards one at a time as quickly as possible to facilitate fine motor coordination and pincer grasping skills of R hand. Pt completed 3 sets x60 cards in the following times: 1 minutes and 30 seconds; 1 minute and 12 seconds; and 1 minute and 10 seconds. - Pt instructed to hold a hand of 12 cards w/ L hand and use R hand to organize hand of cards into color and numerical order to increase fine motor coordination of R hand and bilateral coordination skills. Pt completed 1 set x12 cards w/ increased time and demo'd difficulty w/ using a pincer grasping pattern. Patient Education:   -- Educated pt on progress this period - verb understanding     Therapeutic Exercises: Exercises in bold performed in department today. Items not bolded are carried forward from prior visits for continuity of the record. Exercise/Equipment Resistance/Repetitions HEP Other comments     Finger Abduction/Adduction 2 sets x10 reps [x] SBA for abduction and Min A for facilitating full adduction. Required min v/c's to facilitate movement patterns. Digit 2-5 MCP Flexion/Extension 2 sets x10 reps [x] SBA w/ min v/c's to facilitate movement patterns. Digit 2-5 DIP flexion/extension 2 sets x10 reps [x] SBA w/ min v/c's to facilitate movement patterns. Digit 2-5 PIP Flexion/extension 2 sets x10 reps [x] SBA w/ min v/c's to facilitate movement patterns. Thumb Radial Abduction/Adduction 2 sets x10 reps [x] SBA w/ min v/c's to facilitate movement patterns. Thumb IP Flexion/Extension 2 sets x10 reps [x] SBA w/ min v/c's to facilitate movement patterns. Thumb Flexion/Extension 2 sets x10 reps [x] SBA w/ min v/c's to facilitate movement patterns. Digiflex 3# 2 sets x10 reps []    R wrist and hand strengthening  15 reps  []    Resistive sponge cube 10 reps  Composite flexion w/ SBA following initial instruction. Pincer grasp w/ min A to support sponge and facilitate normal movement patterns. assistive technology, as needed, to promote participation and independence. Cognitive Function:  [] (95300 x15 minutes, 71055 +15 minutes) Integrated activities that address attention, memory, reasoning, executive functioning, problem solving, and/or pragmatic functioning in addition to compensatory strategies to manage the performance of an activity (for example, managing time or schedules, initiating, organizing, and sequencing tasks). NMR:  [] (15280) During functional activities/therapeutic exercises, provided facilitation of normal movement patterns and provided handling/verbal cues to promote postural alignment and stability during static and dynamic movement (sitting or standing). Activities may also include visual perceptual training, proprioception training, and balance retraining during functional activities    Manual Treatments:    [] (22205) Provided manual therapy to mobilize UB for the purpose of modulating pain, promoting relaxation,  increasing ROM, reducing/eliminating soft tissue swelling/inflammation/restriction, improving soft tissue extensibility and allowing for proper ROM for normal function with self-care, functional mobility, transfers, reaching and lifting    Modalities:     [] Electrical Stimulation (28739): Used during TE and TA this date. [] Ultrasound (14892):    Charges:  Timed Code Treatment Minutes: 58   Total Treatment Minutes: 58      [] EVAL (LOW) 32221 (typically 20 minutes face-to-face)  [] EVAL (MOD) 91671 (typically 30 minutes face-to-face)  [] EVAL (HIGH) 89873 (typically 45 minutes face-to-face)  [] RE-EVAL     [x] HP(54592) x15 (1)      [] NMR (73291) x       [] Manual (97846) x       [x] TA (66334) x43 (3)       [] ES(attended) (66287)       [] ES (un) (06618)  [] Other:    GOALS:   Patient stated goal: To increase functional use of R hand. []? Progressing: []? Met: []? Not Met: []? Adjusted     Therapist goals for Patient:  To increase functional use of R hand for improved functional independence in completion of everyday activities and for return to work and leisure activities.      Short Term Goals: To be achieved in: 6 weeks  1. Pt will improve score on UEFS to 70/100 for a subjective report of decreased difficulty with completing every day activities with RUE  [x]? Progressing: []? Met: []? Not Met: []? Adjusted:   2. Pt will improve  strength to 30# to demonstrate improved functional grasp and container management of right hand. []? Progressing: [x]? Met: []? Not Met: []? Adjusted  3. Pt will improve score on BBT to 25 blocks/minute to demonstrate improved timing, speed and accuracy with functional grasp of right hand. [x]? Progressing: []? Met: []? Not Met: []? Adjusted   4. Pt will participate in NHPT to demonstrate improved fine motor coordination of R hand. []? Progressing: [x]? Met: []? Not Met: []? Adjusted  5. Pt will write the letters of the alphabet with a built-up pen with 70% legibility for improved handwriting with R hand. [x]? Progressing: []? Met: []? Not Met: []? Adjusted:   6. Pt will be Supervision with HEP/Home activities program to facilitate independence with carryover from sessions and to progress towards returning to PLOF  []? Progressing: [x]? Met: []? Not Met: []? Adjusted    Long Term Goals: To be achieved in: 12 weeks  1. Pt will improve score on UEFS to 85/100 for a subjective report of decreased difficulty with completing every day activities with RUE  []? Progressing: []? Met: []? Not Met: []? Adjusted  2. Pt will improve  strength to 60# to demonstrate improved functional grasp and container management of right hand. []? Progressing: []? Met: []? Not Met: []? Adjusted   3. Pt will improve score on BBT to 50blocks/minute to demonstrate improved timing, speed and accuracy with functional grasp of right hand. []? Progressing: []? Met: []? Not Met: []? Adjusted  4.  Pt will improve time on NHPT to no more than 2 minutes to demonstrate improved fine motor coordination of R hand. []? Progressing: []? Met: []? Not Met: []? Adjusted  5. Pt will write the letters of the alphabet with a standard pen with 85% legibility for improved handwriting with R hand. []? Progressing: []? Met: []? Not Met: []? Adjusted   6. Pt will be Independent with HEP/Home activities program to facilitate independence with carryover from sessions and to progress towards returning to PLOF  []? Progressing: []? Met: []? Not Met: []? Adjusted    ASSESSMENT:    Pt progressed well this period as evidenced by meeting 3/6 STGs demo'ing improved strength and fine motor coordination of R hand compared to evaluation, however, pt cont to demo decreased functional use of R hand and is functioning below baseline. Pt progressing well with therapeutic exercises as evidenced by increased strength and reduced cueing required to facilitate normal movement patterns. Pt cont to require increased time and demo difficulty with fine motor control of R hand d/t impaired digit dexterity. Pt demo'ing good response to skilled OT services and benefits from repetitive task practice to improve timing, speed, and coordination. Pt cont to benefit from skilled OT services - recommend cont per POC. Treatment/Activity Tolerance:  [x] Patient tolerated treatment well [] Patient limited by fatique  [] Patient limited by pain  [] Patient limited by other medical complications  [] Other:     Overall Progression Towards Functional goals/ Treatment Progress Update:  [x] Patient is progressing as expected towards functional goals listed. [] Progression is slowed due to complexities/Impairments listed. [] Progression has been slowed due to co-morbidities.   [] Plan just implemented, too soon to assess goals progression <30days   [] Goals require adjustment due to lack of progress  [] Patient is not progressing as expected and requires additional follow up with physician  [] Other    Prognosis

## 2021-12-02 ENCOUNTER — HOSPITAL ENCOUNTER (OUTPATIENT)
Dept: OCCUPATIONAL THERAPY | Age: 56
Setting detail: THERAPIES SERIES
Discharge: HOME | End: 2021-12-02
Payer: MEDICAID

## 2021-12-02 PROCEDURE — 97530 THERAPEUTIC ACTIVITIES: CPT

## 2021-12-02 PROCEDURE — 97110 THERAPEUTIC EXERCISES: CPT

## 2021-12-02 NOTE — FLOWSHEET NOTE
and 41 seconds; and 6 minutes and 28 seconds. Activity #2: Pt worked on fine motor coordination of R hand through a variety of large block stacking tasks. - Pt instructed to  blocks one at a time and stack into a tower. Pt completed 1 set x4 blocks and 1 set x6  blocks w/ increased time, but no incidents of dropping blocks this date. - Pt instructed to  blocks one at a time to stack into various structures from a visual model. Pt completed 2  sets x6 blocks w/ increased time, but no incidents of dropping blocks this date. Activity #3: Pt worked on functional grasping of R hand through container management task. Pt instructed to  one container at a time, remove the lid, and then replace the lid. Pt completed 1 set x5 containers w/ increased time and demo'd compensations for 5/5 containers and demo'd increased difficulty w/ grasping and manipulating small lids. Patient Education:  --Educated pt on stroke support group and provided pt w/ a handout to participate in the group in response to pt's reports of feeling down recently - verb understanding     Therapeutic Exercises: Exercises in bold performed in department today. Items not bolded are carried forward from prior visits for continuity of the record. Exercise/Equipment Resistance/Repetitions HEP Other comments     Finger Abduction/Adduction 2 sets x15 reps [x] Min A for adduction and SBA for abduction w/ min v/c's to facilitate movement patterns. Digit 2-5 MCP Flexion/Extension 2 sets x15 reps [x] SBA following initial instruction. Digit 2-5 DIP flexion/extension 2 sets x15 reps [x]  SBA following initial instruction. Digit 2-5 PIP Flexion/extension 2 sets x15 reps [x] SBA following initial instruction. Thumb Radial Abduction/Adduction 2 sets x15 reps [x] Min A for full adduction and min A for inhibiting IP flexors. Required min v/c's to facilitate movement patterns.          Thumb IP Flexion/Extension 2 sets x15 reps [x] SBA w/ min v/c's to facilitate movement patterns. Thumb Flexion/Extension 2 sets x15 reps [x] SBA w/ min v/c's to facilitate movement patterns. Digiflex 3# 2 sets x10 reps []    R wrist and hand strengthening  15 reps  []    Resistive sponge cube 10 reps       Home Exercise Program:  Seated Finger Composite Flexion Extension - 1 x daily - 7 x weekly - 2 sets - 10 reps  Thumb Abduction AROM on Table - 1 x daily - 7 x weekly - 2 sets - 10 reps  Thumb Radial Adduction with Thumb Flexion AROM on Table - 1 x daily - 7 x weekly - 2 sets - 10 reps  Finger Spreading - 1 x daily - 7 x weekly - 2 sets - 10 reps  Thumb Flexion/Extension - 1x daily - 7x weekly - 2 sets - 10 reps  Thumb IP Flexion/Extension - 1x daily - 7x weekly - 2 sets - 10 reps    Therapeutic Exercise:   [x] (47616) Provided verbal/tactile cueing for activities related to strengthening, flexibility, endurance, ROM. Includes review/progression of HEP activities related to strengthening, flexibility, endurance, AROM, proximal shoulder and UE for functional transfers/mobility, self-care, IADLs, and community re-entry    Therapeutic Activities:    [x] (05571) Provided verbal/tactile cueing for activities related to improving balance, coordination, kinesthetic sense, posture, motor skill, proprioception and motor activation to allow for proper function of core, proximal shoulder and UE with self-care, and ADLs, IADLs, functional mobility, work-related and leisure based activities.  Includes review/progression of HEP activities to improve balance, coordination, kinesthetic sense, posture, motor skill, proprioception, proximal shoulder and UE for functional transfers/mobility, self-care, IADLs, and community re-entry    Sensory Integration Techniques:  [] (43718)Provided verbal/tactile feedback to enhance sensory processing and promoting responses that are adaptive to environmental demands    Self-Care/Home Management Training:  [] (67740) Provided training and education in restorative and compensatory strategies/activity modifications in activities of daily living, meal preparation, laundry and other various house maintenance activities. Provided training and education in use of adaptive equipment/devices and assistive technology, as needed, to promote participation and independence. Cognitive Function:  [] (90473 x15 minutes, 45843 +15 minutes) Integrated activities that address attention, memory, reasoning, executive functioning, problem solving, and/or pragmatic functioning in addition to compensatory strategies to manage the performance of an activity (for example, managing time or schedules, initiating, organizing, and sequencing tasks). NMR:  [] (72862) During functional activities/therapeutic exercises, provided facilitation of normal movement patterns and provided handling/verbal cues to promote postural alignment and stability during static and dynamic movement (sitting or standing). Activities may also include visual perceptual training, proprioception training, and balance retraining during functional activities    Manual Treatments:    [] (87169) Provided manual therapy to mobilize UB for the purpose of modulating pain, promoting relaxation,  increasing ROM, reducing/eliminating soft tissue swelling/inflammation/restriction, improving soft tissue extensibility and allowing for proper ROM for normal function with self-care, functional mobility, transfers, reaching and lifting    Modalities:     [] Electrical Stimulation (88469): Used during TE and TA this date.      [] Ultrasound (49275):    Charges:  Timed Code Treatment Minutes: 55   Total Treatment Minutes: 55      [] EVAL (LOW) 31964 (typically 20 minutes face-to-face)  [] EVAL (MOD) 55672 (typically 30 minutes face-to-face)  [] EVAL (HIGH) 29212 (typically 45 minutes face-to-face)  [] RE-EVAL     [x] RT(31912) x15 (1)      [] NMR (96043) x       [] Manual (06796) x       [x] TA ([phone]) x40 (3)       [] ES(attended) (42205)       [] ES (un) (43957)  [] Other:    GOALS:   Patient stated goal: To increase functional use of R hand. []? Progressing: []? Met: []? Not Met: []? Adjusted     Therapist goals for Patient: To increase functional use of R hand for improved functional independence in completion of everyday activities and for return to work and leisure activities.      Short Term Goals: To be achieved in: 6 weeks  1. Pt will improve score on UEFS to 70/100 for a subjective report of decreased difficulty with completing every day activities with RUE  [x]? Progressing: []? Met: []? Not Met: []? Adjusted:   2. Pt will improve  strength to 30# to demonstrate improved functional grasp and container management of right hand. []? Progressing: [x]? Met: []? Not Met: []? Adjusted  3. Pt will improve score on BBT to 25 blocks/minute to demonstrate improved timing, speed and accuracy with functional grasp of right hand. [x]? Progressing: []? Met: []? Not Met: []? Adjusted   4. Pt will participate in NHPT to demonstrate improved fine motor coordination of R hand. []? Progressing: [x]? Met: []? Not Met: []? Adjusted  5. Pt will write the letters of the alphabet with a built-up pen with 70% legibility for improved handwriting with R hand. [x]? Progressing: []? Met: []? Not Met: []? Adjusted:   6. Pt will be Supervision with HEP/Home activities program to facilitate independence with carryover from sessions and to progress towards returning to PLOF  []? Progressing: [x]? Met: []? Not Met: []? Adjusted    Long Term Goals: To be achieved in: 12 weeks  1. Pt will improve score on UEFS to 85/100 for a subjective report of decreased difficulty with completing every day activities with RUE  []? Progressing: []? Met: []? Not Met: []? Adjusted  2. Pt will improve  strength to 60# to demonstrate improved functional grasp and container management of right hand. []? Progressing: []? Met: []?  Not Met: []? Adjusted   3. Pt will improve score on BBT to 50blocks/minute to demonstrate improved timing, speed and accuracy with functional grasp of right hand. []? Progressing: []? Met: []? Not Met: []? Adjusted  4. Pt will improve time on NHPT to no more than 2 minutes to demonstrate improved fine motor coordination of R hand. []? Progressing: []? Met: []? Not Met: []? Adjusted  5. Pt will write the letters of the alphabet with a standard pen with 85% legibility for improved handwriting with R hand. []? Progressing: []? Met: []? Not Met: []? Adjusted   6. Pt will be Independent with HEP/Home activities program to facilitate independence with carryover from sessions and to progress towards returning to PLOF  []? Progressing: []? Met: []? Not Met: []? Adjusted    ASSESSMENT:    Pt progressing well w/ therapeutic exercises as evidenced by increased reps and reducing cueing required for maintaining normal movement patterns. Pt cont to require increased time and demo difficulty w/ fine motor coordination tasks d/t impaired digit dexterity and decreased strength of R hand. Pt benefits from repetitive task practice to improve timing, speed, and coordination of R hand. Pt cont to benefit from skilled OT services - cont per POC. Treatment/Activity Tolerance:  [x] Patient tolerated treatment well [] Patient limited by fatique  [] Patient limited by pain  [] Patient limited by other medical complications  [] Other:     Overall Progression Towards Functional goals/ Treatment Progress Update:  [x] Patient is progressing as expected towards functional goals listed. [] Progression is slowed due to complexities/Impairments listed. [] Progression has been slowed due to co-morbidities.   [] Plan just implemented, too soon to assess goals progression <30days   [] Goals require adjustment due to lack of progress  [] Patient is not progressing as expected and requires additional follow up with physician  [] Other    Prognosis for POC: [x] Good [] Fair  [] Poor    Patient requires continued skilled intervention: [x] Yes  [] No        PLAN:   [x] Continue per altered plan of care [] Alter current plan (see comments): 3x/week for 4 weeks; 2x/week for 8 weeks per pt request to decrease visits per week starting in December. [] Plan of care initiated [] Hold pending MD visit [] Discharge    Electronically signed by: BAL Crowley    Note: If patient does not return for scheduled/recommended follow up visits, this note will serve as a discharge from care along with the most recent update on progress.

## 2021-12-07 ENCOUNTER — HOSPITAL ENCOUNTER (OUTPATIENT)
Dept: OCCUPATIONAL THERAPY | Age: 56
Setting detail: THERAPIES SERIES
Discharge: HOME | End: 2021-12-07
Payer: MEDICAID

## 2021-12-07 PROCEDURE — 97530 THERAPEUTIC ACTIVITIES: CPT

## 2021-12-07 PROCEDURE — 97110 THERAPEUTIC EXERCISES: CPT

## 2021-12-07 NOTE — FLOWSHEET NOTE
[facility]  [address] [place] [address], [address], [address]  Phone: [phone]   Fax: [phone]    Occupational Therapy Treatment Note:     Date:  2021    Patient Name:  Alejandrina Ortiz    :  1965  MRN: [id number]      Medical/Treatment Diagnosis Information:  I63.9 (ICD-10-CM) Cerebral infarction, unspecified  Impaired R hand strength and ROM, decreased ADL status       Insurance/Certification information: None  Physician Information:  Dr. Willie Rizo MD  Plan of care signed:    Yes    Date of Patient follow up with Physician: Unknown     Progress Report: []  Yes  [x]  No      Date Range for reporting period:  Beginnin2021  Ending:     Progress report due:     Recertification due (POC duration/ or 90 days whichever is less): 2022     Visit # Insurance Allowable Auth Needed    Private Pay No     Latex Allergy:  [x]NO      []YES  Preferred Language for Healthcare:   [x]English       []other:  Functional Scale: UEFS (2021)  -- 66.25/100    Pain level: 0/10     SUBJECTIVE:  Pt reports feeling stressed the past week d/t financial concerns and decreased functional use of R hand in daily activities. OBJECTIVE:    Observation: Left lateral lean during functional activities this date.  Test measurements:       RESTRICTIONS/PRECAUTIONS: EF 10-15%, Aspirin    Therapeutic Activities:    Activity #1: Pt worked on fine motor coordination of R hand through Ft. meli tasks. Pt instructed to  Ft. meli blocks x9 one at a time and place vertically in a line. Pt then instructed to  Ft. meli blocks one at a time to stack two blocks on top of each other (bottom block oriented vertically and top block oriented horizontally to make a \"T\" shape). Pt completed 1 set x6 blocks for stacking w/ increased time. Pt required min v/c's to maintain upright posture.          Activity #2: Pt worked on fine motor coordination and pincer grasping skills of R hand through \"Connect 4\" task. Pt instructed to  Connect 4 pieces one at a time and place into game board. Pt completed 3 sets x6 pieces untimed and 3 sets x6 pieces with the following times: 1 minute; 25 seconds; and 1 minute and 17 seconds. Pt demo'd several incidents of dropping game pieces during last set, requiring increased time for completion of task. Activity #3: Pt worked on tying and untying laces to work on bilateral coordination and fine motor control of R hand. Pt instructed to tie laces into a bow and untie as quickly as possible. Pt completed 2 sets x5 laces in the following times: 3 minutes and 5 seconds; and 3 minutes and 56 seconds. Pt required increased time for completion of task and min v/c's to maintain upright posture, but demo'd improved tautness of bows compared to previous sessions. Activity #4: Pt instructed to grasp cones one at a time and place on and remove from top of an overhead shelf as quickly as possible to work on functional grasping of R hand and endurance for daily activities. Pt completed 1 set x6 cones in 1 minute and 15 seconds. Patient Education:  --Discussed with pt the importance of being patient throughout the recovery process as he will gain the gross motor movements of his hand before precision/fine motor integration will occur. Reinforced that stroke recovery is a gradual process - verb understanding    --Reminded pt of [facility] stroke support group meeting next Tuesday (12/14/2021) in response to pt's frustrations related to stroke recovery - verb understanding     Therapeutic Exercises: Exercises in bold performed in department today. Items not bolded are carried forward from prior visits for continuity of the record. Exercise/Equipment Resistance/Repetitions HEP Other comments     Finger Abduction/Adduction 2 sets x15 reps [x] Required Min A for facilitating full abduction and adduction.  Pt required min v/c's for facilitating movement patterns. Digit 2-5 MCP Flexion/Extension 2 sets x15 reps [x] SBA following initial instruction. Digit 2-5 DIP flexion/extension 2 sets x15 reps [x] SBA following initial instruction. Digit 2-5 PIP Flexion/extension 2 sets x15 reps [x] SBA following initial instruction. Thumb Radial Abduction/Adduction 2 sets x15 reps [x] SBA following initial instruction and visual demo. Pt required min v/c's for facilitating movement patterns       Thumb IP Flexion/Extension 2 sets x15 reps [x] SBA following initial instruction and visual demo. Pt required min v/c's for facilitating movement patterns       Thumb Flexion/Extension 2 sets x15 reps [x] SBA following initial instruction. Pt required min v/c's for facilitating movement patterns   Digiflex 3# 2 sets x10 reps []    R wrist and hand strengthening  15 reps  []    Resistive sponge cube 10 reps       Home Exercise Program:  Seated Finger Composite Flexion Extension - 1 x daily - 7 x weekly - 2 sets - 10 reps  Thumb Abduction AROM on Table - 1 x daily - 7 x weekly - 2 sets - 10 reps  Thumb Radial Adduction with Thumb Flexion AROM on Table - 1 x daily - 7 x weekly - 2 sets - 10 reps  Finger Spreading - 1 x daily - 7 x weekly - 2 sets - 10 reps  Thumb Flexion/Extension - 1x daily - 7x weekly - 2 sets - 10 reps  Thumb IP Flexion/Extension - 1x daily - 7x weekly - 2 sets - 10 reps    Therapeutic Exercise:   [x] (20030) Provided verbal/tactile cueing for activities related to strengthening, flexibility, endurance, ROM.  Includes review/progression of HEP activities related to strengthening, flexibility, endurance, AROM, proximal shoulder and UE for functional transfers/mobility, self-care, IADLs, and community re-entry    Therapeutic Activities:    [x] (12532) Provided verbal/tactile cueing for activities related to improving balance, coordination, kinesthetic sense, posture, motor skill, proprioception and motor activation to allow for proper function of core, proximal shoulder and UE with self-care, and ADLs, IADLs, functional mobility, work-related and leisure based activities. Includes review/progression of HEP activities to improve balance, coordination, kinesthetic sense, posture, motor skill, proprioception, proximal shoulder and UE for functional transfers/mobility, self-care, IADLs, and community re-entry    Sensory Integration Techniques:  [] (51484)Provided verbal/tactile feedback to enhance sensory processing and promoting responses that are adaptive to environmental demands    Self-Care/Home Management Training:  [] (44603) Provided training and education in restorative and compensatory strategies/activity modifications in activities of daily living, meal preparation, laundry and other various house maintenance activities. Provided training and education in use of adaptive equipment/devices and assistive technology, as needed, to promote participation and independence. Cognitive Function:  [] (67045 x15 minutes, 85665 +15 minutes) Integrated activities that address attention, memory, reasoning, executive functioning, problem solving, and/or pragmatic functioning in addition to compensatory strategies to manage the performance of an activity (for example, managing time or schedules, initiating, organizing, and sequencing tasks). NMR:  [] (03005) During functional activities/therapeutic exercises, provided facilitation of normal movement patterns and provided handling/verbal cues to promote postural alignment and stability during static and dynamic movement (sitting or standing).  Activities may also include visual perceptual training, proprioception training, and balance retraining during functional activities    Manual Treatments:    [] (57240) Provided manual therapy to mobilize UB for the purpose of modulating pain, promoting relaxation,  increasing ROM, reducing/eliminating soft tissue swelling/inflammation/restriction, improving soft tissue extensibility and allowing for proper ROM for normal function with self-care, functional mobility, transfers, reaching and lifting    Modalities:     [] Electrical Stimulation (30287): Used during TE and TA this date. [] Ultrasound (28754):    Charges:  Timed Code Treatment Minutes: 58   Total Treatment Minutes: 58      [] EVAL (LOW) 43762 (typically 20 minutes face-to-face)  [] EVAL (MOD) 13622 (typically 30 minutes face-to-face)  [] EVAL (HIGH) 85122 (typically 45 minutes face-to-face)  [] RE-EVAL     [x] CL(21316) x15 (1)      [] NMR (76329) x       [] Manual (42348) x       [x] TA (80579) x43 (3)       [] ES(attended) (63421)       [] ES (un) (92119)  [] Other:    GOALS:   Patient stated goal: To increase functional use of R hand. []? Progressing: []? Met: []? Not Met: []? Adjusted     Therapist goals for Patient: To increase functional use of R hand for improved functional independence in completion of everyday activities and for return to work and leisure activities.      Short Term Goals: To be achieved in: 6 weeks  1. Pt will improve score on UEFS to 70/100 for a subjective report of decreased difficulty with completing every day activities with RUE  [x]? Progressing: []? Met: []? Not Met: []? Adjusted:   2. Pt will improve  strength to 30# to demonstrate improved functional grasp and container management of right hand. []? Progressing: [x]? Met: []? Not Met: []? Adjusted  3. Pt will improve score on BBT to 25 blocks/minute to demonstrate improved timing, speed and accuracy with functional grasp of right hand. [x]? Progressing: []? Met: []? Not Met: []? Adjusted   4. Pt will participate in NHPT to demonstrate improved fine motor coordination of R hand. []? Progressing: [x]? Met: []? Not Met: []? Adjusted  5. Pt will write the letters of the alphabet with a built-up pen with 70% legibility for improved handwriting with R hand. [x]? Progressing: []? Met: []? Not Met: []? Adjusted:   6. Pt will be Supervision with HEP/Home activities program to facilitate independence with carryover from sessions and to progress towards returning to PLOF  []? Progressing: [x]? Met: []? Not Met: []? Adjusted    Long Term Goals: To be achieved in: 12 weeks  1. Pt will improve score on UEFS to 85/100 for a subjective report of decreased difficulty with completing every day activities with RUE  []? Progressing: []? Met: []? Not Met: []? Adjusted  2. Pt will improve  strength to 60# to demonstrate improved functional grasp and container management of right hand. []? Progressing: []? Met: []? Not Met: []? Adjusted   3. Pt will improve score on BBT to 50blocks/minute to demonstrate improved timing, speed and accuracy with functional grasp of right hand. []? Progressing: []? Met: []? Not Met: []? Adjusted  4. Pt will improve time on NHPT to no more than 2 minutes to demonstrate improved fine motor coordination of R hand. []? Progressing: []? Met: []? Not Met: []? Adjusted  5. Pt will write the letters of the alphabet with a standard pen with 85% legibility for improved handwriting with R hand. []? Progressing: []? Met: []? Not Met: []? Adjusted   6. Pt will be Independent with HEP/Home activities program to facilitate independence with carryover from sessions and to progress towards returning to PLOF  []? Progressing: []? Met: []? Not Met: []? Adjusted    ASSESSMENT:    Pt progressing well w/ right hand digit exercises as evidenced by increased reps and less cueing required for facilitating normal movement patterns, however, cont to demo decreased strength compared to baseline. Pt cont to require increased time and demo difficulty w/ fine motor coordination tasks secondary to impaired digit dexterity resulting in decreased grasping skills and functional use of R hand in daily activities.  Pt benefits from repetitive task practice to relearn normal grasping patterns, increase strength, and improve timing, speed, and coordination of R hand. Pt cont to benefit from skilled OT services - cont per POC. Treatment/Activity Tolerance:  [x] Patient tolerated treatment well [] Patient limited by fatique  [] Patient limited by pain  [] Patient limited by other medical complications  [] Other:     Overall Progression Towards Functional goals/ Treatment Progress Update:  [x] Patient is progressing as expected towards functional goals listed. [] Progression is slowed due to complexities/Impairments listed. [] Progression has been slowed due to co-morbidities. [] Plan just implemented, too soon to assess goals progression <30days   [] Goals require adjustment due to lack of progress  [] Patient is not progressing as expected and requires additional follow up with physician  [] Other    Prognosis for POC: [x] Good [] Fair  [] Poor    Patient requires continued skilled intervention: [x] Yes  [] No        PLAN:   [x] Continue per altered plan of care [] Alter current plan (see comments): 3x/week for 4 weeks; 2x/week for 8 weeks per pt request to decrease visits per week starting in December. [] Plan of care initiated [] Hold pending MD visit [] Discharge    Electronically signed by: BAL Simpson    Note: If patient does not return for scheduled/recommended follow up visits, this note will serve as a discharge from care along with the most recent update on progress.

## 2021-12-09 ENCOUNTER — HOSPITAL ENCOUNTER (OUTPATIENT)
Dept: OCCUPATIONAL THERAPY | Age: 56
Setting detail: THERAPIES SERIES
Discharge: HOME | End: 2021-12-09
Payer: MEDICAID

## 2021-12-09 PROCEDURE — 97530 THERAPEUTIC ACTIVITIES: CPT

## 2021-12-09 PROCEDURE — 97110 THERAPEUTIC EXERCISES: CPT

## 2021-12-09 NOTE — FLOWSHEET NOTE
[facility]  [address], [facility] [address]  Phone: [phone]   Fax: [phone]    Occupational Therapy Treatment Note:     Date:  2021    Patient Name:  Sigifredo Hrota    :  1965  MRN: [id number]      Medical/Treatment Diagnosis Information:  I63.9 (ICD-10-CM) Cerebral infarction, unspecified  Impaired R hand strength and ROM, decreased ADL status       Insurance/Certification information: None  Physician Information:  Dr. Marylee Ginger, MD  Plan of care signed:    Yes    Date of Patient follow up with Physician: Unknown     Progress Report: []  Yes  [x]  No      Date Range for reporting period:  Beginnin2021  Ending:     Progress report due:     Recertification due (POC duration/ or 90 days whichever is less): 2022     Visit # Insurance Allowable Auth Needed    Private Pay No     Latex Allergy:  [x]NO      []YES  Preferred Language for Healthcare:   [x]English       []other:  Functional Scale: UEFS (2021)  -- 66.25/100    Pain level: 0/10    SUBJECTIVE: Pt reports feeling tired and frustrated this morning d/t little sleep night before this date. Pt reports not sleeping well recently because he stays up \"thinking about things\" d/t stress. OBJECTIVE:    Observation: Left lateal lean during functional activities this date.  Test measurements:       RESTRICTIONS/PRECAUTIONS: EF 10-15%, Aspirin    Therapeutic Activities:    Activity #1: Pt instructed to  large blocks one at a time and place into a line and then put a golf ball on top of each cube to work on fine motor coordination and grasping skills of R hand. Pt then instructed to remove golf balls and place blocks back onto tray one at a time. Pt completed 2 sets x6 blocks and golf balls. Pt completed 1st set not timed, and completed 2nd set in 2 minutes and 36 seconds.          Activity #2: Pt worked on fine motor coordination of R hand through stacking and unstacking of medium-sized blocks. Pt instructed to  blocks one at a time and build into a tower. Then pt instructed to remove blocks from tower one at a time. Pt completed 2 sets and partially completed a 3rd set. Pt completed 1 set x5 blocks, 1 set x7 blocks, and 1 set x5/7 blocks. Pt demo'd dropping of blocks on third set and refused to finish tower. Patient Education:  -- Educated pt on importance of patience throughout the stroke recovery process d/t pt's frustration w/ impairment in R hand - verb understanding     Therapeutic Exercises: Exercises in bold performed in department today. Items not bolded are carried forward from prior visits for continuity of the record. Exercise/Equipment Resistance/Repetitions HEP Other comments     Finger Abduction/Adduction 2 sets x20 reps [x] Min A for full abduction and SBA for adduction. Digit 2-5 MCP Flexion/Extension 2 sets x20 reps [x] SBA following initial instruction. Digit 2-5 DIP flexion/extension 2 sets x20 reps [x] SBA following initial instruction. Digit 2-5 PIP Flexion/extension 2 sets x20 reps [x] SBA following initial instruction. Thumb Radial Abduction/Adduction 2 sets x20 reps [x] Min A for facilitating full abduction and adduction to promote normal movement patterns. Thumb IP Flexion/Extension 2 sets x20 reps [x] CGA to stabilize MCP in order to isolate IP for completion of exercises. Thumb Flexion/Extension 2 sets x20 reps [x] SBA following initial instruction.     Digiflex 3# 2 sets x10 reps []    R wrist and hand strengthening  15 reps  []    Resistive sponge cube 10 reps       Home Exercise Program:  Seated Finger Composite Flexion Extension - 1 x daily - 7 x weekly - 2 sets - 10 reps  Thumb Abduction AROM on Table - 1 x daily - 7 x weekly - 2 sets - 10 reps  Thumb Radial Adduction with Thumb Flexion AROM on Table - 1 x daily - 7 x weekly - 2 sets - 10 reps  Finger strategies to manage the performance of an activity (for example, managing time or schedules, initiating, organizing, and sequencing tasks). NMR:  [] (63134) During functional activities/therapeutic exercises, provided facilitation of normal movement patterns and provided handling/verbal cues to promote postural alignment and stability during static and dynamic movement (sitting or standing). Activities may also include visual perceptual training, proprioception training, and balance retraining during functional activities    Manual Treatments:    [] (40303) Provided manual therapy to mobilize UB for the purpose of modulating pain, promoting relaxation,  increasing ROM, reducing/eliminating soft tissue swelling/inflammation/restriction, improving soft tissue extensibility and allowing for proper ROM for normal function with self-care, functional mobility, transfers, reaching and lifting    Modalities:     [] Electrical Stimulation (39150): Used during TE and TA this date. [] Ultrasound (07643):    Charges:  Timed Code Treatment Minutes: 40   Total Treatment Minutes: 40      [] EVAL (LOW) 18807 (typically 20 minutes face-to-face)  [] EVAL (MOD) 46764 (typically 30 minutes face-to-face)  [] EVAL (HIGH) 59148 (typically 45 minutes face-to-face)  [] RE-EVAL     [x] SW(46950) x15 (1)      [] NMR (10243) x       [] Manual (45680) x       [x] TA (07827) x25 (2)       [] ES(attended) (12865)       [] ES (un) (37164)  [] Other:    GOALS:   Patient stated goal: To increase functional use of R hand. []? Progressing: []? Met: []? Not Met: []? Adjusted     Therapist goals for Patient: To increase functional use of R hand for improved functional independence in completion of everyday activities and for return to work and leisure activities.      Short Term Goals: To be achieved in: 6 weeks  1.  Pt will improve score on UEFS to 70/100 for a subjective report of decreased difficulty with completing every day activities with RUE  [x]? Progressing: []? Met: []? Not Met: []? Adjusted:   2. Pt will improve  strength to 30# to demonstrate improved functional grasp and container management of right hand. []? Progressing: [x]? Met: []? Not Met: []? Adjusted  3. Pt will improve score on BBT to 25 blocks/minute to demonstrate improved timing, speed and accuracy with functional grasp of right hand. [x]? Progressing: []? Met: []? Not Met: []? Adjusted   4. Pt will participate in NHPT to demonstrate improved fine motor coordination of R hand. []? Progressing: [x]? Met: []? Not Met: []? Adjusted  5. Pt will write the letters of the alphabet with a built-up pen with 70% legibility for improved handwriting with R hand. [x]? Progressing: []? Met: []? Not Met: []? Adjusted:   6. Pt will be Supervision with HEP/Home activities program to facilitate independence with carryover from sessions and to progress towards returning to PLOF  []? Progressing: [x]? Met: []? Not Met: []? Adjusted    Long Term Goals: To be achieved in: 12 weeks  1. Pt will improve score on UEFS to 85/100 for a subjective report of decreased difficulty with completing every day activities with RUE  []? Progressing: []? Met: []? Not Met: []? Adjusted  2. Pt will improve  strength to 60# to demonstrate improved functional grasp and container management of right hand. []? Progressing: []? Met: []? Not Met: []? Adjusted   3. Pt will improve score on BBT to 50blocks/minute to demonstrate improved timing, speed and accuracy with functional grasp of right hand. []? Progressing: []? Met: []? Not Met: []? Adjusted  4. Pt will improve time on NHPT to no more than 2 minutes to demonstrate improved fine motor coordination of R hand. []? Progressing: []? Met: []? Not Met: []? Adjusted  5. Pt will write the letters of the alphabet with a standard pen with 85% legibility for improved handwriting with R hand. []? Progressing: []? Met: []? Not Met: []? Adjusted   6.  Pt will be Independent with HEP/Home activities program to facilitate independence with carryover from sessions and to progress towards returning to PLOF  []? Progressing: []? Met: []? Not Met: []? Adjusted    ASSESSMENT:    Pt limited by fatigue and frustration this date resulting in reduced participation in tasks that are graded up for the just-right challenge. Pt cont to progress with R hand digit strength as evidenced by increased reps, however, requires minimal assistance for facilitating full range of motion. Pt cont to require increased time and demo difficulty with fine motor coordination tasks d/t impaired digit dexterity of R hand. Pt benefits from repetitive task practice to relearn normal movement patterns and improve timing, speed, and coordination. Pt cont to benefit from skilled OT services - cont per POC. Treatment/Activity Tolerance:  [] Patient tolerated treatment well [x] Patient limited by fatique  [] Patient limited by pain  [] Patient limited by other medical complications  [] Other:     Overall Progression Towards Functional goals/ Treatment Progress Update:  [x] Patient is progressing as expected towards functional goals listed. [] Progression is slowed due to complexities/Impairments listed. [] Progression has been slowed due to co-morbidities. [] Plan just implemented, too soon to assess goals progression <30days   [] Goals require adjustment due to lack of progress  [] Patient is not progressing as expected and requires additional follow up with physician  [] Other    Prognosis for POC: [x] Good [] Fair  [] Poor    Patient requires continued skilled intervention: [x] Yes  [] No        PLAN:   [x] Continue per altered plan of care [] Alter current plan (see comments): 3x/week for 4 weeks; 2x/week for 8 weeks per pt request to decrease visits per week starting in December.    [] Plan of care initiated [] Hold pending MD visit [] Discharge    Electronically signed by: Tiffanie Herrera OTS    Note: If patient does not return for scheduled/recommended follow up visits, this note will serve as a discharge from care along with the most recent update on progress.

## 2021-12-14 ENCOUNTER — HOSPITAL ENCOUNTER (OUTPATIENT)
Dept: OCCUPATIONAL THERAPY | Age: 56
Setting detail: THERAPIES SERIES
Discharge: HOME | End: 2021-12-14
Payer: MEDICAID

## 2021-12-14 NOTE — CARE COORDINATION
[facility]  [address], [address], [address]  Phone: [phone]   Fax: [phone]    Occupational Therapy Missed Visit Note     Date:  2021    Patient Name:  Karla Srinivasan      :  1965    MRN: [id number]      Cancelled visits to date: 3 (2021), (2021), (2021)  No-shows to date: 0    For today's appointment patient:  [x]  Cancelled  []  Rescheduled appointment  []  No-show     Reason given by patient:  [x]  Patient ill  []  Conflicting appointment  []  No transportation    []  Conflict with work  []  No reason given  []  Other:     Comments:  Reports nausea and not feeling well, and \"would rather be safe than sorry. \"      Electronically signed by:  JAYLENE Boggs/MERCEDES #[id number]

## 2021-12-16 ENCOUNTER — HOSPITAL ENCOUNTER (OUTPATIENT)
Dept: OCCUPATIONAL THERAPY | Age: 56
Setting detail: THERAPIES SERIES
Discharge: HOME | End: 2021-12-16
Payer: MEDICAID

## 2021-12-16 NOTE — CARE COORDINATION
[facility] Outpatient [facility]  [address] [place] [address], [address], [address]  Phone: [phone]   Fax: [phone]    Occupational Therapy Missed Visit Note     Date:  2021    Patient Name:  Darryl Roblero      :  1965    MRN: [id number]      [address] [place] [address] visits to date: 4  (2021), (2021), (2021), (2021)  No-shows to date: 0    For today's appointment patient:  [x]  Cancelled  []  Rescheduled appointment  []  No-show     Reason given by patient:  [x]  Patient ill  []  Conflicting appointment  []  No transportation    []  Conflict with work  []  No reason given  []  Other:     Comments:  Pt called to report having a negative side effect from medication     Electronically signed by: KYLE Dawson, OTR/L, C/NDT

## 2021-12-21 ENCOUNTER — HOSPITAL ENCOUNTER (OUTPATIENT)
Dept: OCCUPATIONAL THERAPY | Age: 56
Setting detail: THERAPIES SERIES
Discharge: HOME | End: 2021-12-21
Payer: MEDICAID

## 2021-12-21 NOTE — CARE COORDINATION
[facility] Outpatient Therapy  [address] [place] [address], [address], [address]  Phone: [phone]   Fax: [phone]    Occupational Therapy Missed Visit Note     Date:  2021    Patient Name:  Melanie Whitney      :  1965    MRN: [id number]      [address] [place] [address] visits to date:   (2021), (2021), (2021), (2021), (2021)  No-shows to date: 0    For today's appointment patient:  [x]  Cancelled  []  Rescheduled appointment  []  No-show     Reason given by patient:  [x]  Patient ill  []  Conflicting appointment  []  No transportation    []  Conflict with work  []  No reason given  []  Other:     Comments:  Pt called to report having a negative side effect from medication     Electronically signed by: KYLE Lowery, OTR/L, C/NDT

## 2021-12-23 ENCOUNTER — HOSPITAL ENCOUNTER (OUTPATIENT)
Dept: OCCUPATIONAL THERAPY | Age: 56
Setting detail: THERAPIES SERIES
Discharge: HOME | End: 2021-12-23
Payer: MEDICAID

## 2021-12-23 NOTE — CARE COORDINATION
[facility] Outpatient [facility]  [address] [place] [address], [address] [place] [address]  Phone: [phone]   Fax: [phone]    Occupational Therapy Missed Visit Note     Date:  2021    Patient Name:  Isaura Siddiqui      :  1965    MRN: [id number]      [address] [place] [address] visits to date: 6  (2021), (2021), (2021), (2021), (2021), (2021)  No-shows to date: 0    For today's appointment patient:  [x]  Cancelled  []  Rescheduled appointment  []  No-show     Reason given by patient:  [x]  Patient ill  []  Conflicting appointment  []  No transportation    []  Conflict with work  []  No reason given  []  Other:     Comments:  Pt called to report having a negative side effect from medication     Electronically signed by: KYLE Rolon, OTR/L, C/NDT

## 2021-12-28 ENCOUNTER — HOSPITAL ENCOUNTER (OUTPATIENT)
Dept: OCCUPATIONAL THERAPY | Age: 56
Setting detail: THERAPIES SERIES
Discharge: HOME | End: 2021-12-28
Payer: MEDICAID

## 2021-12-28 ENCOUNTER — APPOINTMENT (OUTPATIENT)
Dept: OCCUPATIONAL THERAPY | Age: 56
End: 2021-12-28
Payer: MEDICAID

## 2021-12-28 NOTE — CARE COORDINATION
[facility] Outpatient Therapy  [address] [place] [address], [address], [address]  Phone: [phone]   Fax: [phone]    Occupational Therapy Missed Visit Note     Date:  2021    Patient Name:  Donavon Miranda      :  1965    MRN: [id number]      [address] [place] [address] visits to date: 6  (2021), (2021), (2021), (2021), (2021), (2021), (2021), (2021)  No-shows to date: 0    For today's appointment patient:  [x]  Cancelled  []  Rescheduled appointment  []  No-show     Reason given by patient:  [x]  Patient ill  []  Conflicting appointment  []  No transportation    []  Conflict with work  []  No reason given  []  Other:     Comments:  Pt called to report having a negative side effect from medication - pt also cancelled in advance for 2021    Electronically signed by: KYLE Milan, OTR/L, C/NDT

## 2021-12-30 ENCOUNTER — APPOINTMENT (OUTPATIENT)
Dept: OCCUPATIONAL THERAPY | Age: 56
End: 2021-12-30
Payer: MEDICAID

## 2022-01-03 ENCOUNTER — HOSPITAL ENCOUNTER (OUTPATIENT)
Dept: OCCUPATIONAL THERAPY | Age: 57
Setting detail: THERAPIES SERIES
Discharge: HOME | End: 2022-01-03

## 2022-01-03 NOTE — CARE COORDINATION
[facility] Outpatient [facility]  [address] [place] [address], [address] [place] [address]  Phone: [phone]   Fax: [phone]    Occupational Therapy Missed Visit Note     Date:  1/3/2022    Patient Name:  Prabhakar Gonzalez      :  1965    MRN: [id number]      [address] [place] [address] visits to date: 5  (2021), (2021), (2021), (2021), (2021), (2021), (2021), (2021), (1/3/2022)  No-shows to date: 0    For today's appointment patient:  [x]  Cancelled  []  Rescheduled appointment  []  No-show     Reason given by patient:  [x]  Patient ill  []  Conflicting appointment  []  No transportation    []  Conflict with work  []  No reason given  []  Other:     Comments:  Pt called to report having a negative side effect from medication - pt also cancelled in advance for 2022    Electronically signed by: KYLE Adames, OTSONIA/L, C/NDT

## 2022-01-10 ENCOUNTER — HOSPITAL ENCOUNTER (OUTPATIENT)
Dept: OCCUPATIONAL THERAPY | Age: 57
Setting detail: THERAPIES SERIES
Discharge: HOME | End: 2022-01-10

## 2022-01-10 NOTE — CARE COORDINATION
[facility] Outpatient [facility]  [address]  [employer], [facility]  Phone: [phone]   Fax: [phone]    Occupational Therapy Missed Visit Note     Date:  1/10/2022    Patient Name:  Yun Barrera      :  1965    MRN: [id number]      [address] [place] [address] visits to date: 5  (2021), (2021), (2021), (2021), (2021), (2021), (2021), (2021), (1/3/2022)  No-shows to date: 1 (1/10/2022)    For today's appointment patient:  [x]  Cancelled  []  Rescheduled appointment  [x]  No-show     Reason given by patient:  []  Patient ill  []  Conflicting appointment  []  No transportation    []  Conflict with work  [x]  No reason given  []  Other:     Comments:  No call, no show    Electronically signed by: KYLE Garcias, JAYLENE/MERCEDES, C/NDT

## 2022-01-13 ENCOUNTER — HOSPITAL ENCOUNTER (OUTPATIENT)
Dept: OCCUPATIONAL THERAPY | Age: 57
Setting detail: THERAPIES SERIES
Discharge: HOME | End: 2022-01-13

## 2022-01-13 NOTE — CARE COORDINATION
[facility]  [address] [place] [address], [address], [address]  Phone: [phone]   Fax: [phone]    Occupational Therapy Missed Visit Note     Date:  2022    Patient Name:  Casey Day      :  1965    MRN: [id number]      [address] [place] [address] visits to date: 5  (2021), (2021), (2021), (2021), (2021), (2021), (2021), (2021), (1/3/2022)  No-shows to date: 2 (2021) (1/10/2022)    For today's appointment patient:  []  Cancelled  []  Rescheduled appointment  [x]  No-show     Reason given by patient:  []  Patient ill  []  Conflicting appointment  []  No transportation    []  Conflict with work  [x]  No reason given  []  Other:     Comments:  No call, no show, no answer when called - LVM    Electronically signed by: KYLE Hugo, OTR/L, C/NDT

## 2022-01-17 ENCOUNTER — HOSPITAL ENCOUNTER (OUTPATIENT)
Dept: OCCUPATIONAL THERAPY | Age: 57
Setting detail: THERAPIES SERIES
Discharge: HOME | End: 2022-01-17

## 2022-01-17 NOTE — CARE COORDINATION
[facility] Outpatient [facility]  [address]  [address] [place] [address], [address], [address]  Phone: [phone]   Fax: [phone]    Occupational Therapy Missed Visit Note     Date:  2022    Patient Name:  Gwen Sun      :  1965    MRN: [id number]      [address] [place] [address] visits to date: 5  (2021), (2021), (2021), (2021), (2021), (2021), (2021), (2021), (1/3/2022)  No-shows to date: 3 (2022) (2021) (1/10/2022)    For today's appointment patient:  []  Cancelled  []  Rescheduled appointment  [x]  No-show     Reason given by patient:  []  Patient ill  []  Conflicting appointment  []  No transportation    []  Conflict with work  [x]  No reason given  []  Other:     Comments:  No call, no show    Electronically signed by: Carmina Sicard, MSOT, OTR/L, C/NDT

## 2022-01-24 ENCOUNTER — HOSPITAL ENCOUNTER (OUTPATIENT)
Dept: OCCUPATIONAL THERAPY | Age: 57
Setting detail: THERAPIES SERIES
Discharge: HOME | End: 2022-01-24

## 2022-01-24 NOTE — CARE COORDINATION
[facility] Out[facility]  [address] [place] [address], [address]  Phone: [phone]   Fax: [phone]    Occupational Therapy Missed Visit Note     Date:  2022    Patient Name:  Yun Barrera      :  1965    MRN: [id number]      [address] [place] [address] visits to date: 5  (2021), (2021), (2021), (2021), (2021), (2021), (2021), (2021), (1/3/2022)  No-shows to date: 4 (2022) (2022) (2021) (1/10/2022)    For today's appointment patient:  []  Cancelled  []  Rescheduled appointment  [x]  No-show     Reason given by patient:  []  Patient ill  []  Conflicting appointment  []  No transportation    []  Conflict with work  [x]  No reason given  []  Other:     Comments:  No call, no show    Electronically signed by: KYLE Garcias, JAYLENE/MERCEDES, C/NDT

## 2022-01-26 ENCOUNTER — HOSPITAL ENCOUNTER (OUTPATIENT)
Dept: OCCUPATIONAL THERAPY | Age: 57
Setting detail: THERAPIES SERIES
Discharge: HOME | End: 2022-01-26

## 2022-01-26 NOTE — CARE COORDINATION
[facility]  [address]  [address] [place] [address], [address] [place] [address] Av  Phone: [phone]   Fax: [phone]    Occupational Therapy Missed Visit Note     Date:  2022    Patient Name:  Flower Brooks      :  1965    MRN: [id number]      [address] [place] [address] visits to date: 5  (2021), (2021), (2021), (2021), (2021), (2021), (2021), (2021), (1/3/2022)  No-shows to date: 5 (2022) (2022) (2022) (2021) (1/10/2022)    For today's appointment patient:  []  Cancelled  []  Rescheduled appointment  [x]  No-show     Reason given by patient:  []  Patient ill  []  Conflicting appointment  []  No transportation    []  Conflict with work  [x]  No reason given  []  Other:     Comments:  No call, no show    Electronically signed by: KYLE Perez, OTR/L, C/NDT